# Patient Record
Sex: FEMALE | Race: BLACK OR AFRICAN AMERICAN | URBAN - METROPOLITAN AREA
[De-identification: names, ages, dates, MRNs, and addresses within clinical notes are randomized per-mention and may not be internally consistent; named-entity substitution may affect disease eponyms.]

---

## 2018-10-24 PROBLEM — L70.0 ACNE VULGARIS: Status: ACTIVE | Noted: 2018-10-24

## 2018-12-14 PROBLEM — Z30.46 NEXPLANON REMOVAL: Status: ACTIVE | Noted: 2018-12-14

## 2019-06-10 PROBLEM — R87.618 PAP SMEAR ABNORMALITY OF CERVIX/HUMAN PAPILLOMAVIRUS (HPV) POSITIVE: Status: ACTIVE | Noted: 2018-08-06

## 2019-06-10 PROBLEM — O13.9 GESTATIONAL HYPERTENSION: Status: ACTIVE | Noted: 2017-06-27

## 2019-06-10 PROBLEM — Z34.93 PRENATAL CARE IN THIRD TRIMESTER: Status: ACTIVE | Noted: 2017-06-14

## 2019-12-12 PROBLEM — Z34.93 PRENATAL CARE IN THIRD TRIMESTER: Status: RESOLVED | Noted: 2017-06-14 | Resolved: 2019-12-12

## 2019-12-12 PROBLEM — O09.292 HX OF PREECLAMPSIA, PRIOR PREGNANCY, CURRENTLY PREGNANT, SECOND TRIMESTER: Status: ACTIVE | Noted: 2019-12-12

## 2019-12-12 PROBLEM — O09.93 SUPERVISION OF HIGH RISK PREGNANCY IN THIRD TRIMESTER: Status: ACTIVE | Noted: 2019-12-12

## 2019-12-12 PROBLEM — Z30.46 NEXPLANON REMOVAL: Status: RESOLVED | Noted: 2018-12-14 | Resolved: 2019-12-12

## 2020-01-17 ENCOUNTER — HOSPITAL ENCOUNTER (OUTPATIENT)
Dept: LAB | Age: 25
Discharge: HOME | End: 2020-01-17
Payer: COMMERCIAL

## 2020-01-17 DIAGNOSIS — O09.292 HX OF PREECLAMPSIA, PRIOR PREGNANCY, CURRENTLY PREGNANT, SECOND TRIMESTER: ICD-10-CM

## 2020-01-17 LAB
ALBUMIN SERPL-MCNC: 2.9 G/DL (ref 3.5–5)
ALBUMIN/GLOB SERPL: 0.7 {RATIO} (ref 1.2–3.5)
ALP SERPL-CCNC: 48 U/L (ref 50–136)
ALT SERPL-CCNC: 12 U/L (ref 12–65)
ANION GAP SERPL CALC-SCNC: 7 MMOL/L (ref 7–16)
AST SERPL-CCNC: 16 U/L (ref 15–37)
BASOPHILS # BLD: 0 K/UL (ref 0–0.2)
BASOPHILS NFR BLD: 0 % (ref 0–2)
BILIRUB SERPL-MCNC: 0.5 MG/DL (ref 0.2–1.1)
BUN SERPL-MCNC: 7 MG/DL (ref 6–23)
CALCIUM SERPL-MCNC: 8.6 MG/DL (ref 8.3–10.4)
CHLORIDE SERPL-SCNC: 106 MMOL/L (ref 98–107)
CO2 SERPL-SCNC: 23 MMOL/L (ref 21–32)
COLLECT DURATION TIME UR: 24 HR
CREAT 24H UR-MRATE: 4890 MG/24 HR (ref 600–1800)
CREAT SERPL-MCNC: 0.46 MG/DL (ref 0.6–1)
CREAT UR-MCNC: 163 MG/DL
DIFFERENTIAL METHOD BLD: ABNORMAL
EOSINOPHIL # BLD: 0.1 K/UL (ref 0–0.8)
EOSINOPHIL NFR BLD: 1 % (ref 0.5–7.8)
ERYTHROCYTE [DISTWIDTH] IN BLOOD BY AUTOMATED COUNT: 12.1 % (ref 11.9–14.6)
GLOBULIN SER CALC-MCNC: 3.9 G/DL (ref 2.3–3.5)
GLUCOSE SERPL-MCNC: 82 MG/DL (ref 65–100)
HCT VFR BLD AUTO: 29 % (ref 35.8–46.3)
HGB BLD-MCNC: 9.5 G/DL (ref 11.7–15.4)
IMM GRANULOCYTES # BLD AUTO: 0 K/UL (ref 0–0.5)
IMM GRANULOCYTES NFR BLD AUTO: 0 % (ref 0–5)
LDH SERPL L TO P-CCNC: 127 U/L (ref 100–190)
LYMPHOCYTES # BLD: 1.2 K/UL (ref 0.5–4.6)
LYMPHOCYTES NFR BLD: 18 % (ref 13–44)
MCH RBC QN AUTO: 28.4 PG (ref 26.1–32.9)
MCHC RBC AUTO-ENTMCNC: 32.8 G/DL (ref 31.4–35)
MCV RBC AUTO: 86.8 FL (ref 79.6–97.8)
MONOCYTES # BLD: 0.6 K/UL (ref 0.1–1.3)
MONOCYTES NFR BLD: 9 % (ref 4–12)
NEUTS SEG # BLD: 4.9 K/UL (ref 1.7–8.2)
NEUTS SEG NFR BLD: 72 % (ref 43–78)
NRBC # BLD: 0 K/UL (ref 0–0.2)
PLATELET # BLD AUTO: 246 K/UL (ref 150–450)
PMV BLD AUTO: 9.9 FL (ref 9.4–12.3)
POTASSIUM SERPL-SCNC: 3.5 MMOL/L (ref 3.5–5.1)
PROT SERPL-MCNC: 6.8 G/DL (ref 6.3–8.2)
RBC # BLD AUTO: 3.34 M/UL (ref 4.05–5.2)
SODIUM SERPL-SCNC: 136 MMOL/L (ref 136–145)
SPECIMEN VOL ?TM UR: 3000 ML
URATE SERPL-MCNC: 3.9 MG/DL (ref 2.6–6)
WBC # BLD AUTO: 6.8 K/UL (ref 4.3–11.1)

## 2020-01-17 PROCEDURE — 84550 ASSAY OF BLOOD/URIC ACID: CPT

## 2020-01-17 PROCEDURE — 36415 COLL VENOUS BLD VENIPUNCTURE: CPT

## 2020-01-17 PROCEDURE — 85025 COMPLETE CBC W/AUTO DIFF WBC: CPT

## 2020-01-17 PROCEDURE — 82570 ASSAY OF URINE CREATININE: CPT

## 2020-01-17 PROCEDURE — 80053 COMPREHEN METABOLIC PANEL: CPT

## 2020-01-17 PROCEDURE — 83615 LACTATE (LD) (LDH) ENZYME: CPT

## 2020-01-17 PROCEDURE — 86335 IMMUNFIX E-PHORSIS/URINE/CSF: CPT

## 2020-01-17 PROCEDURE — 84156 ASSAY OF PROTEIN URINE: CPT

## 2020-01-20 LAB
ALBUMIN UR ELPH-MCNC: 3.7 MG/DL
ALBUMIN/GLOB SERPL: 0.3 {RATIO}
ALPHA1 GLOB 24H UR ELPH-MCNC: 1.5 MG/DL
ALPHA2 GLOB SERPL ELPH-MCNC: 4 MG/DL
B-GLOBULIN UR QL ELPH: 5.1 MG/DL
COLLECT DURATION TIME UR: 24 HR
GAMMA GLOB MFR UR ELPH: 3.7 MG/DL
M PROTEIN UR-MCNC: ABNORMAL MG/DL
PROT 24H UR-MRATE: 540 MG/24HR (ref 40–150)
PROT PATTERN SPEC IFE-IMP: ABNORMAL
PROT PATTERN UR ELPH-IMP: ABNORMAL
PROT UR-MCNC: 18 MG/DL
SPECIMEN VOL ?TM UR: 3000 ML

## 2020-03-25 ENCOUNTER — HOSPITAL ENCOUNTER (OUTPATIENT)
Dept: LAB | Age: 25
Discharge: HOME | End: 2020-03-25
Payer: COMMERCIAL

## 2020-03-25 LAB
COLLECT DURATION TIME UR: 24 HR
CREAT 24H UR-MRATE: 1627.5 MG/24 HR (ref 600–1800)
CREAT UR-MCNC: 155 MG/DL
SPECIMEN VOL ?TM UR: 1050 ML

## 2020-03-25 PROCEDURE — 81050 URINALYSIS VOLUME MEASURE: CPT

## 2020-03-25 PROCEDURE — 84156 ASSAY OF PROTEIN URINE: CPT

## 2020-03-27 LAB
ALBUMIN 24H MFR UR ELPH: 15.1 %
ALPHA1 GLOB 24H MFR UR ELPH: 3.1 %
ALPHA2 GLOB 24H MFR UR ELPH: 11.1 %
B-GLOBULIN MFR UR ELPH: 44.1 %
COLLECT DURATION TIME UR: 24 HR
GAMMA GLOB 24H MFR UR ELPH: 26.6 %
INTERPRETATION UR IFE-IMP: ABNORMAL
M PROTEIN 24H MFR UR ELPH: ABNORMAL %
NOTE, 149533: ABNORMAL
PROT 24H UR-MRATE: 176 MG/24 HR (ref 30–150)
PROT UR-MCNC: 16 MG/DL
SPECIMEN VOL ?TM UR: 1100 ML

## 2020-03-30 NOTE — PROGRESS NOTES
Per Dr Steven Donahue instructions left message of normal results.  Also explained that if she saw results on Mychart,  Some labs are showing as abnormal explained that you have to look at the all the labs as a whole

## 2020-05-03 ENCOUNTER — HOSPITAL ENCOUNTER (OUTPATIENT)
Age: 25
Discharge: HOME | End: 2020-05-03
Attending: OBSTETRICS & GYNECOLOGY | Admitting: OBSTETRICS & GYNECOLOGY
Payer: COMMERCIAL

## 2020-05-03 VITALS
HEART RATE: 90 BPM | TEMPERATURE: 99.4 F | SYSTOLIC BLOOD PRESSURE: 117 MMHG | RESPIRATION RATE: 18 BRPM | DIASTOLIC BLOOD PRESSURE: 71 MMHG

## 2020-05-03 PROBLEM — R10.9 ABDOMINAL PAIN DURING PREGNANCY IN THIRD TRIMESTER: Status: ACTIVE | Noted: 2020-05-03

## 2020-05-03 PROBLEM — O26.893 ABDOMINAL PAIN DURING PREGNANCY IN THIRD TRIMESTER: Status: ACTIVE | Noted: 2020-05-03

## 2020-05-03 PROCEDURE — 99283 EMERGENCY DEPT VISIT LOW MDM: CPT

## 2020-05-03 PROCEDURE — 59025 FETAL NON-STRESS TEST: CPT

## 2020-05-03 RX ORDER — GUAIFENESIN 100 MG/5ML
81 LIQUID (ML) ORAL DAILY
COMMUNITY
End: 2020-05-22

## 2020-05-03 NOTE — PROGRESS NOTES
1233-Pt to 437 for PHILIP with c/o contractions this morning but since arriving to hospital have stopped. Galo Luarianne Upon entrance to hospital pt with temp of 100. Check 3 times at door. Mask placed and placed in 437. Pt denies any COVID-19 exposure or h/o travel. Denies symptoms of COVID-19. Tempeture recheck in room was 98.9 and 99.4 oral.     1240-Dr. Salas to room. SVE 0/0/high. May DC home with labor precautions. 1250-Patient discharged home per MD order. Discharge instructions completed and patient verbalized understanding. Questions encouraged. Stable at discharge.

## 2020-05-03 NOTE — DISCHARGE INSTRUCTIONS
Patient Education        Pregnancy Precautions: Care Instructions  Your Care Instructions    There is no sure way to prevent labor before your due date ( labor) or to prevent most other pregnancy problems. But there are things you can do to increase your chances of a healthy pregnancy. Go to your appointments, follow your doctor's advice, and take good care of yourself. Eat well, and exercise (if your doctor agrees). And make sure to drink plenty of water. Follow-up care is a key part of your treatment and safety. Be sure to make and go to all appointments, and call your doctor if you are having problems. It's also a good idea to know your test results and keep a list of the medicines you take. How can you care for yourself at home? · Make sure you go to your prenatal appointments. At each visit, your doctor will check your blood pressure. Your doctor will also check to see if you have protein in your urine. High blood pressure and protein in urine are signs of preeclampsia. This condition can be dangerous for you and your baby. · Drink plenty of fluids, enough so that your urine is light yellow or clear like water. Dehydration can cause contractions. If you have kidney, heart, or liver disease and have to limit fluids, talk with your doctor before you increase the amount of fluids you drink. · Tell your doctor right away if you notice any symptoms of an infection, such as:  ? Burning when you urinate. ? A foul-smelling discharge from your vagina. ? Vaginal itching. ? Unexplained fever. ? Unusual pain or soreness in your uterus or lower belly. · Eat a balanced diet. Include plenty of foods that are high in calcium and iron. ? Foods high in calcium include milk, cheese, yogurt, almonds, and broccoli. ? Foods high in iron include red meat, shellfish, poultry, eggs, beans, raisins, whole-grain bread, and leafy green vegetables. · Do not smoke.  If you need help quitting, talk to your doctor about stop-smoking programs and medicines. These can increase your chances of quitting for good. · Do not drink alcohol or use illegal drugs. · Follow your doctor's directions about activity. Your doctor will let you know how much, if any, exercise you can do. · Ask your doctor if you can have sex. If you are at risk for early labor, your doctor may ask you to not have sex. · Take care to prevent falls. During pregnancy, your joints are loose, and your balance is off. Sports such as bicycling, skiing, or in-line skating can increase your risk of falling. And don't ride horses or motorcycles, dive, water ski, scuba dive, or parachute jump while you are pregnant. · Avoid getting very hot. Do not use saunas or hot tubs. Avoid staying out in the sun in hot weather for long periods. Take acetaminophen (Tylenol) to lower a high fever. · Do not take any over-the-counter or herbal medicines or supplements without talking to your doctor or pharmacist first.  When should you call for help? Call 911 anytime you think you may need emergency care. For example, call if:    · You passed out (lost consciousness).     · You have a seizure.     · You have severe vaginal bleeding.     · You have severe pain in your belly or pelvis.     · You have had fluid gushing or leaking from your vagina and you know or think the umbilical cord is bulging into your vagina. If this happens, immediately get down on your knees so your rear end (buttocks) is higher than your head. This will decrease the pressure on the cord until help arrives.   [facility] your doctor now or seek immediate medical care if:    · You have signs of preeclampsia, such as:  ? Sudden swelling of your face, hands, or feet. ? New vision problems (such as dimness, blurring, or seeing spots).   ? A severe headache.     · You have any vaginal bleeding.     · You have belly pain or cramping.     · You have a fever.     · You have had regular contractions (with or without pain) for an hour. This means that you have 8 or more within 1 hour or 4 or more in 20 minutes after you change your position and drink fluids.     · You have a sudden release of fluid from your vagina.     · You have low back pain or pelvic pressure that does not go away.     · You notice that your baby has stopped moving or is moving much less than normal.    Watch closely for changes in your health, and be sure to contact your doctor if you have any problems. Where can you learn more? Go to http://karli-ervin.info/  Enter Y951 in the search box to learn more about \"Pregnancy Precautions: Care Instructions. \"  Current as of: May 29, 2019Content Version: 12.4  © 8011-2976 [employer]. Care instructions adapted under license by [employer] (which disclaims liability or warranty for this information). If you have questions about a medical condition or this instruction, always ask your healthcare professional. [address] any warranty or liability for your use of this information.

## 2020-05-03 NOTE — H&P
Chief Complaint: ctx      21 y.o. female  at 37w1d  weeks gestation who is seen for several hours of irregular ctx that have now resolved. Pt notes good FM. She denies VB, LOF, UTI or PEC symptoms. Pt denies nausea, vomiting, fevers, chills, CP, SOB, cough, mailaise, muscle aches, recent travel, or any known exposure to the COVID-19 virus. HISTORY:    Social History     Substance and Sexual Activity   Sexual Activity Yes    Partners: Male    Birth control/protection: Implant     Patient's last menstrual period was 2019 (exact date).     Social History     Socioeconomic History    Marital status: SINGLE     Spouse name: Not on file    Number of children: Not on file    Years of education: Not on file    Highest education level: Not on file   Occupational History    Not on file   Social Needs    Financial resource strain: Not on file    Food insecurity     Worry: Not on file     Inability: Not on file    Transportation needs     Medical: Not on file     Non-medical: Not on file   Tobacco Use    Smoking status: Never Smoker    Smokeless tobacco: Never Used   Substance and Sexual Activity    Alcohol use: No    Drug use: No    Sexual activity: Yes     Partners: Male     Birth control/protection: Implant   Lifestyle    Physical activity     Days per week: Not on file     Minutes per session: Not on file    Stress: Not on file   Relationships    Social connections     Talks on phone: Not on file     Gets together: Not on file     Attends Rastafarian service: Not on file     Active member of club or organization: Not on file     Attends meetings of clubs or organizations: Not on file     Relationship status: Not on file    Intimate partner violence     Fear of current or ex partner: Not on file     Emotionally abused: Not on file     Physically abused: Not on file     Forced sexual activity: Not on file   Other Topics Concern   [address] Service Not Asked    Blood Transfusions Not Asked  Caffeine Concern Yes    Occupational Exposure Not Asked    Hobby Hazards Not Asked    Sleep Concern Not Asked    Stress Concern Not Asked    Weight Concern Not Asked    Special Diet Not Asked    Back Care Not Asked    Exercise No    Bike Helmet Not Asked    Seat Belt Yes    Self-Exams Yes   Social History Narrative    Abuse: Feels safe at home, no history of physical abuse, no history of sexual abuse       Past Surgical History:   Procedure Laterality Date    GASTRIC BYPASS,OBESE<150CM NAPOLEON-EN-Y         Past Medical History:   Diagnosis Date    Gestational hypertension          ROS:  An 8 point review of symptoms negative except for chief complaint as described above. PHYSICAL EXAM:  Blood pressure 117/71, pulse 90, temperature 98.9 °F (37.2 °C), resp. rate 18, last menstrual period 09/23/2019. Constitutional: The patient appears well, alert, oriented x 3. Cardiovascular: Heart RRR, no murmurs. Respiratory: Lungs clear, no respiratory distress  GI: Abdomen soft, nontender, no guarding  No fundal tenderness  Musculoskeletal: no cva tenderness  Upper ext: no edema, reflexes +2  Lower ext: no edema, neg donell's, reflexes +2  Skin: no rashes or lesions  Psychiatric:Mood/ Affect: appropriate  Genitourinary: SVE: long and closed  FHT: Category 1 with mod variability and + accels; reactive  TOCO: rare ctx    I personally reviewed pt's medical record including relevant labs and ultrasounds  I reviewed the NST at today's encounter    Assessment/Plan:  Pt presents with false labor. Upon entering the hospital pt's temp was taken and she was noted to have a slight elevation in the temp. Temps taken on L&D are all normal; pt's H&P is unremarkable; pt has no h/o travel or known exposure to the COVID-19 virus. In light of all this, I believe the temp taken at the hospital entrance was in error. Pt discharged to home with labor precautions. Pt to f/u with her PObP in the next 1-2 days.

## 2020-05-18 NOTE — PROGRESS NOTES
Verbal orders received from Dr Gely Chambers for AM IOL on 5/20/2020. Patient notified by Dr Gely Chambers to arrive @ [id number].

## 2020-05-20 ENCOUNTER — HOSPITAL ENCOUNTER (INPATIENT)
Age: 25
LOS: 2 days | Discharge: HOME | End: 2020-05-22
Attending: OBSTETRICS & GYNECOLOGY | Admitting: OBSTETRICS & GYNECOLOGY
Payer: COMMERCIAL

## 2020-05-20 ENCOUNTER — ANESTHESIA EVENT (OUTPATIENT)
Dept: LABOR AND DELIVERY | Age: 25
End: 2020-05-20
Payer: COMMERCIAL

## 2020-05-20 ENCOUNTER — ANESTHESIA (OUTPATIENT)
Dept: LABOR AND DELIVERY | Age: 25
End: 2020-05-20
Payer: COMMERCIAL

## 2020-05-20 PROBLEM — O13.3 GESTATIONAL HTN, THIRD TRIMESTER: Status: ACTIVE | Noted: 2020-05-20

## 2020-05-20 PROBLEM — Z3A.39 39 WEEKS GESTATION OF PREGNANCY: Status: ACTIVE | Noted: 2020-05-20

## 2020-05-20 PROBLEM — Z34.90 ENCOUNTER FOR PLANNED INDUCTION OF LABOR: Status: ACTIVE | Noted: 2020-05-20

## 2020-05-20 LAB
ABO + RH BLD: NORMAL
ALBUMIN SERPL-MCNC: 2.6 G/DL (ref 3.5–5)
ALBUMIN/GLOB SERPL: 0.7 {RATIO} (ref 1.2–3.5)
ALP SERPL-CCNC: 131 U/L (ref 50–130)
ALT SERPL-CCNC: 17 U/L (ref 12–65)
ANION GAP SERPL CALC-SCNC: 10 MMOL/L (ref 7–16)
ARTERIAL PATENCY WRIST A: ABNORMAL
ARTERIAL PATENCY WRIST A: ABNORMAL
AST SERPL-CCNC: 25 U/L (ref 15–37)
BASE DEFICIT BLD-SCNC: 5 MMOL/L
BASE DEFICIT BLD-SCNC: 7 MMOL/L
BASOPHILS # BLD: 0 K/UL (ref 0–0.2)
BASOPHILS NFR BLD: 0 % (ref 0–2)
BDY SITE: ABNORMAL
BDY SITE: ABNORMAL
BILIRUB SERPL-MCNC: 0.3 MG/DL (ref 0.2–1.1)
BLOOD GROUP ANTIBODIES SERPL: NORMAL
BUN SERPL-MCNC: 11 MG/DL (ref 6–23)
CA-I BLD-MCNC: 1.57 MMOL/L (ref 1.12–1.32)
CA-I BLD-MCNC: 1.7 MMOL/L (ref 1.12–1.32)
CALCIUM SERPL-MCNC: 8.8 MG/DL (ref 8.3–10.4)
CHLORIDE SERPL-SCNC: 107 MMOL/L (ref 98–107)
CO2 SERPL-SCNC: 20 MMOL/L (ref 21–32)
COLLECT TIME,HTIME: 1948
COLLECT TIME,HTIME: 1948
CREAT SERPL-MCNC: 0.68 MG/DL (ref 0.6–1)
DIFFERENTIAL METHOD BLD: ABNORMAL
EOSINOPHIL # BLD: 0.1 K/UL (ref 0–0.8)
EOSINOPHIL NFR BLD: 1 % (ref 0.5–7.8)
ERYTHROCYTE [DISTWIDTH] IN BLOOD BY AUTOMATED COUNT: 13.4 % (ref 11.9–14.6)
GAS FLOW.O2 O2 DELIVERY SYS: ABNORMAL L/MIN
GAS FLOW.O2 O2 DELIVERY SYS: ABNORMAL L/MIN
GLOBULIN SER CALC-MCNC: 4 G/DL (ref 2.3–3.5)
GLUCOSE BLD STRIP.AUTO-MCNC: 85 MG/DL (ref 65–100)
GLUCOSE BLD STRIP.AUTO-MCNC: 86 MG/DL (ref 65–100)
GLUCOSE SERPL-MCNC: 85 MG/DL (ref 65–100)
HCO3 BLD-SCNC: 21.3 MMOL/L (ref 22–26)
HCO3 BLD-SCNC: 22 MMOL/L (ref 22–26)
HCT VFR BLD AUTO: 31.7 % (ref 35.8–46.3)
HGB BLD-MCNC: 10.3 G/DL (ref 11.7–15.4)
IMM GRANULOCYTES # BLD AUTO: 0.1 K/UL (ref 0–0.5)
IMM GRANULOCYTES NFR BLD AUTO: 1 % (ref 0–5)
LYMPHOCYTES # BLD: 1.7 K/UL (ref 0.5–4.6)
LYMPHOCYTES NFR BLD: 21 % (ref 13–44)
MCH RBC QN AUTO: 27.8 PG (ref 26.1–32.9)
MCHC RBC AUTO-ENTMCNC: 32.5 G/DL (ref 31.4–35)
MCV RBC AUTO: 85.7 FL (ref 79.6–97.8)
MONOCYTES # BLD: 0.8 K/UL (ref 0.1–1.3)
MONOCYTES NFR BLD: 10 % (ref 4–12)
NEUTS SEG # BLD: 5.5 K/UL (ref 1.7–8.2)
NEUTS SEG NFR BLD: 67 % (ref 43–78)
NRBC # BLD: 0 K/UL (ref 0–0.2)
O2/TOTAL GAS SETTING VFR VENT: 21 %
O2/TOTAL GAS SETTING VFR VENT: 21 %
PCO2 BLD: 49 MMHG (ref 35–45)
PCO2 BLD: 50.1 MMHG (ref 35–45)
PH BLD: 7.24 [PH] (ref 7.35–7.45)
PH BLD: 7.26 [PH] (ref 7.35–7.45)
PLATELET # BLD AUTO: 205 K/UL (ref 150–450)
PMV BLD AUTO: 11.8 FL (ref 9.4–12.3)
PO2 BLD: 17 MMHG (ref 75–100)
PO2 BLD: 19 MMHG (ref 75–100)
POTASSIUM BLD-SCNC: 5 MMOL/L (ref 3.5–5.1)
POTASSIUM BLD-SCNC: 5.1 MMOL/L (ref 3.5–5.1)
POTASSIUM SERPL-SCNC: 3.5 MMOL/L (ref 3.5–5.1)
PROT SERPL-MCNC: 6.6 G/DL (ref 6.3–8.2)
RBC # BLD AUTO: 3.7 M/UL (ref 4.05–5.2)
SAO2 % BLD: 19 % (ref 95–98)
SAO2 % BLD: 22 % (ref 95–98)
SERVICE CMNT-IMP: ABNORMAL
SERVICE CMNT-IMP: ABNORMAL
SODIUM BLD-SCNC: 136 MMOL/L (ref 136–145)
SODIUM BLD-SCNC: 138 MMOL/L (ref 136–145)
SODIUM SERPL-SCNC: 137 MMOL/L (ref 136–145)
SPECIMEN EXP DATE BLD: NORMAL
SPECIMEN TYPE: ABNORMAL
SPECIMEN TYPE: ABNORMAL
WBC # BLD AUTO: 8.2 K/UL (ref 4.3–11.1)

## 2020-05-20 PROCEDURE — 3E033VJ INTRODUCTION OF OTHER HORMONE INTO PERIPHERAL VEIN, PERCUTANEOUS APPROACH: ICD-10-PCS | Performed by: OBSTETRICS & GYNECOLOGY

## 2020-05-20 PROCEDURE — 00HU33Z INSERTION OF INFUSION DEVICE INTO SPINAL CANAL, PERCUTANEOUS APPROACH: ICD-10-PCS | Performed by: ANESTHESIOLOGY

## 2020-05-20 PROCEDURE — 74011000258 HC RX REV CODE- 258: Performed by: OBSTETRICS & GYNECOLOGY

## 2020-05-20 PROCEDURE — 77030011945 HC CATH URIN INT ST MENT -A

## 2020-05-20 PROCEDURE — 74011250637 HC RX REV CODE- 250/637: Performed by: OBSTETRICS & GYNECOLOGY

## 2020-05-20 PROCEDURE — 85025 COMPLETE CBC W/AUTO DIFF WBC: CPT

## 2020-05-20 PROCEDURE — 65270000029 HC RM PRIVATE

## 2020-05-20 PROCEDURE — 82947 ASSAY GLUCOSE BLOOD QUANT: CPT

## 2020-05-20 PROCEDURE — 74011250636 HC RX REV CODE- 250/636: Performed by: OBSTETRICS & GYNECOLOGY

## 2020-05-20 PROCEDURE — 82803 BLOOD GASES ANY COMBINATION: CPT

## 2020-05-20 PROCEDURE — 74011250636 HC RX REV CODE- 250/636: Performed by: ANESTHESIOLOGY

## 2020-05-20 PROCEDURE — 80053 COMPREHEN METABOLIC PANEL: CPT

## 2020-05-20 PROCEDURE — 77030018846 HC SOL IRR STRL H20 ICUM -A

## 2020-05-20 PROCEDURE — A4300 CATH IMPL VASC ACCESS PORTAL: HCPCS | Performed by: ANESTHESIOLOGY

## 2020-05-20 PROCEDURE — 77030014125 HC TY EPDRL BBMI -B: Performed by: ANESTHESIOLOGY

## 2020-05-20 PROCEDURE — 86900 BLOOD TYPING SEROLOGIC ABO: CPT

## 2020-05-20 PROCEDURE — 36415 COLL VENOUS BLD VENIPUNCTURE: CPT

## 2020-05-20 RX ORDER — SODIUM CHLORIDE 0.9 % (FLUSH) 0.9 %
5-40 SYRINGE (ML) INJECTION AS NEEDED
Status: DISCONTINUED | OUTPATIENT
Start: 2020-05-20 | End: 2020-05-21

## 2020-05-20 RX ORDER — LIDOCAINE HYDROCHLORIDE 20 MG/ML
JELLY TOPICAL
Status: DISCONTINUED | OUTPATIENT
Start: 2020-05-20 | End: 2020-05-20 | Stop reason: HOSPADM

## 2020-05-20 RX ORDER — PRENATAL VIT 96/IRON FUM/FOLIC 27MG-0.8MG
1 TABLET ORAL DAILY
Status: DISCONTINUED | OUTPATIENT
Start: 2020-05-21 | End: 2020-05-22 | Stop reason: HOSPADM

## 2020-05-20 RX ORDER — DIPHENHYDRAMINE HCL 25 MG
25 CAPSULE ORAL
Status: DISCONTINUED | OUTPATIENT
Start: 2020-05-20 | End: 2020-05-22 | Stop reason: HOSPADM

## 2020-05-20 RX ORDER — OXYTOCIN/RINGER'S LACTATE 30/500 ML
0-25 PLASTIC BAG, INJECTION (ML) INTRAVENOUS
Status: DISCONTINUED | OUTPATIENT
Start: 2020-05-20 | End: 2020-05-20 | Stop reason: HOSPADM

## 2020-05-20 RX ORDER — IBUPROFEN 800 MG/1
800 TABLET ORAL 3 TIMES DAILY
Status: DISCONTINUED | OUTPATIENT
Start: 2020-05-20 | End: 2020-05-22 | Stop reason: HOSPADM

## 2020-05-20 RX ORDER — SIMETHICONE 80 MG
80 TABLET,CHEWABLE ORAL
Status: DISCONTINUED | OUTPATIENT
Start: 2020-05-20 | End: 2020-05-22 | Stop reason: HOSPADM

## 2020-05-20 RX ORDER — LIDOCAINE HYDROCHLORIDE 10 MG/ML
1 INJECTION INFILTRATION; PERINEURAL
Status: DISCONTINUED | OUTPATIENT
Start: 2020-05-20 | End: 2020-05-20 | Stop reason: HOSPADM

## 2020-05-20 RX ORDER — ROPIVACAINE HYDROCHLORIDE 5 MG/ML
INJECTION, SOLUTION EPIDURAL; INFILTRATION; PERINEURAL AS NEEDED
Status: DISCONTINUED | OUTPATIENT
Start: 2020-05-20 | End: 2020-05-20 | Stop reason: HOSPADM

## 2020-05-20 RX ORDER — ADHESIVE BANDAGE
30 BANDAGE TOPICAL DAILY PRN
Status: DISCONTINUED | OUTPATIENT
Start: 2020-05-20 | End: 2020-05-22 | Stop reason: HOSPADM

## 2020-05-20 RX ORDER — DEXTROSE, SODIUM CHLORIDE, SODIUM LACTATE, POTASSIUM CHLORIDE, AND CALCIUM CHLORIDE 5; .6; .31; .03; .02 G/100ML; G/100ML; G/100ML; G/100ML; G/100ML
125 INJECTION, SOLUTION INTRAVENOUS CONTINUOUS
Status: DISCONTINUED | OUTPATIENT
Start: 2020-05-20 | End: 2020-05-22 | Stop reason: HOSPADM

## 2020-05-20 RX ORDER — ZOLPIDEM TARTRATE 5 MG/1
5 TABLET ORAL
Status: DISCONTINUED | OUTPATIENT
Start: 2020-05-20 | End: 2020-05-22 | Stop reason: HOSPADM

## 2020-05-20 RX ORDER — OXYTOCIN/RINGER'S LACTATE 30/500 ML
250 PLASTIC BAG, INJECTION (ML) INTRAVENOUS ONCE
Status: DISPENSED | OUTPATIENT
Start: 2020-05-20 | End: 2020-05-20

## 2020-05-20 RX ORDER — ROPIVACAINE HYDROCHLORIDE 2 MG/ML
INJECTION, SOLUTION EPIDURAL; INFILTRATION; PERINEURAL AS NEEDED
Status: DISCONTINUED | OUTPATIENT
Start: 2020-05-20 | End: 2020-05-20 | Stop reason: HOSPADM

## 2020-05-20 RX ORDER — MINERAL OIL
120 OIL (ML) ORAL
Status: COMPLETED | OUTPATIENT
Start: 2020-05-20 | End: 2020-05-20

## 2020-05-20 RX ORDER — BUTORPHANOL TARTRATE 2 MG/ML
1 INJECTION INTRAMUSCULAR; INTRAVENOUS
Status: DISCONTINUED | OUTPATIENT
Start: 2020-05-20 | End: 2020-05-20 | Stop reason: HOSPADM

## 2020-05-20 RX ORDER — ONDANSETRON 4 MG/1
4 TABLET, ORALLY DISINTEGRATING ORAL
Status: ACTIVE | OUTPATIENT
Start: 2020-05-20 | End: 2020-05-21

## 2020-05-20 RX ORDER — HYDROCODONE BITARTRATE AND ACETAMINOPHEN 5; 325 MG/1; MG/1
1 TABLET ORAL
Status: DISCONTINUED | OUTPATIENT
Start: 2020-05-20 | End: 2020-05-22 | Stop reason: HOSPADM

## 2020-05-20 RX ORDER — SODIUM CHLORIDE 0.9 % (FLUSH) 0.9 %
5-40 SYRINGE (ML) INJECTION EVERY 8 HOURS
Status: DISCONTINUED | OUTPATIENT
Start: 2020-05-20 | End: 2020-05-21

## 2020-05-20 RX ORDER — NALOXONE HYDROCHLORIDE 0.4 MG/ML
0.4 INJECTION, SOLUTION INTRAMUSCULAR; INTRAVENOUS; SUBCUTANEOUS AS NEEDED
Status: DISCONTINUED | OUTPATIENT
Start: 2020-05-20 | End: 2020-05-22 | Stop reason: HOSPADM

## 2020-05-20 RX ADMIN — IBUPROFEN 800 MG: 800 TABLET, FILM COATED ORAL at 23:46

## 2020-05-20 RX ADMIN — SODIUM CHLORIDE 2.5 MILLION UNITS: 9 INJECTION, SOLUTION INTRAVENOUS at 16:55

## 2020-05-20 RX ADMIN — Medication 8 ML: at 14:00

## 2020-05-20 RX ADMIN — SODIUM CHLORIDE 2.5 MILLION UNITS: 9 INJECTION, SOLUTION INTRAVENOUS at 13:34

## 2020-05-20 RX ADMIN — MINERAL OIL 120 ML: 471.95 OIL ORAL at 19:45

## 2020-05-20 RX ADMIN — SODIUM CHLORIDE, SODIUM LACTATE, POTASSIUM CHLORIDE, CALCIUM CHLORIDE, AND DEXTROSE MONOHYDRATE 125 ML/HR: 600; 310; 30; 20; 5 INJECTION, SOLUTION INTRAVENOUS at 08:26

## 2020-05-20 RX ADMIN — SODIUM CHLORIDE 5 MILLION UNITS: 900 INJECTION, SOLUTION INTRAVENOUS at 08:25

## 2020-05-20 RX ADMIN — ROPIVACAINE HYDROCHLORIDE 13 ML: 150 INJECTION, SOLUTION EPIDURAL; INFILTRATION; PERINEURAL at 13:59

## 2020-05-20 RX ADMIN — Medication 2 MILLI-UNITS/MIN: at 08:46

## 2020-05-20 NOTE — PROGRESS NOTES
05/20/20 1219   Membranes   Membrane Status AROM   Sac Identifier Sac 1   Rupture Date 05/20/20   Rupture Time 1219   Amniotic Fluid Description Clear   Amniotic Fluid Volume  Scant   Cervical Exam   Dilation (cm) 4   Eff 50 %   Station -2   Vaginal exam done by?   Dr Andre Slade

## 2020-05-20 NOTE — ANESTHESIA PROCEDURE NOTES
Epidural Block    Start time: 5/20/2020 1:49 PM  End time: 5/20/2020 1:59 PM  Performed by: Taylor Crowley MD  Authorized by: Taylor Crowley MD     Pre-Procedure  Indication: at surgeon's request and labor epidural    Preanesthetic Checklist: patient identified, risks and benefits discussed, anesthesia consent, site marked, patient being monitored, timeout performed and anesthesia consent    Timeout Time: 13:57        Epidural:   Patient position:  Seated  Prep region:  Lumbar  Prep: Chlorhexidine    Location:  L3-4    Needle and Epidural Catheter:   Needle Type:  Tuohy  Needle Gauge:  17 G  Injection Technique:  Loss of resistance using saline  Attempts:  1  Catheter Size:  19 G  Events: no blood with aspiration, no cerebrospinal fluid with aspiration, no paresthesia and negative aspiration test    Test Dose:  Negative    Assessment:   Catheter Secured:  Tegaderm and tape  Insertion:  Uncomplicated  Patient tolerance:  Patient tolerated the procedure well with no immediate complications

## 2020-05-20 NOTE — PROGRESS NOTES
05/20/20 1551   Cervical Exam   Dilation (cm) 8   Eff 90 %   Station -1   SC for 150 cc clear yellow urine. Repositioned to throne.

## 2020-05-20 NOTE — PROGRESS NOTES
05/20/20 1435   Cervical Exam   Dilation (cm) 6  (6-7)   Eff 70 %   Station -2   Pt assisted to left lateral on p nut birthing ball.

## 2020-05-20 NOTE — PROGRESS NOTES
Admitted for induction of labor. Admission assessment as documented. Oriented to room, call system and white board. Plan of care reviewed and questions answered. Consents signed, identification band verified and placed. IV placed, blood work drawn per order and sent to lab.

## 2020-05-20 NOTE — ANESTHESIA PREPROCEDURE EVALUATION
Relevant Problems   No relevant active problems       Anesthetic History   No history of anesthetic complications            Review of Systems / Medical History  Patient summary reviewed and pertinent labs reviewed    Pulmonary  Within defined limits                 Neuro/Psych   Within defined limits           Cardiovascular  Within defined limits  Hypertension (gest): well controlled              Exercise tolerance: >4 METS     GI/Hepatic/Renal  Within defined limits              Endo/Other  Within defined limits           Other Findings   Comments: primip  PROM           Physical Exam    Airway  Mallampati: I  TM Distance: 4 - 6 cm  Neck ROM: normal range of motion   Mouth opening: Normal     Cardiovascular  Regular rate and rhythm,  S1 and S2 normal,  no murmur, click, rub, or gallop  Rhythm: regular  Rate: normal         Dental  No notable dental hx       Pulmonary  Breath sounds clear to auscultation               Abdominal  Abdominal exam normal       Other Findings            Anesthetic Plan    ASA: 2  Anesthesia type: epidural      Post-op pain plan if not by surgeon: indwelling epidural catheter    Induction: Intravenous  Anesthetic plan and risks discussed with: Patient and Spouse

## 2020-05-21 LAB
HCT VFR BLD AUTO: 30.5 % (ref 35.8–46.3)
HGB BLD-MCNC: 9.9 G/DL (ref 11.7–15.4)

## 2020-05-21 PROCEDURE — 75410000003 HC RECOV DEL/VAG/CSECN EA 0.5 HR

## 2020-05-21 PROCEDURE — 36415 COLL VENOUS BLD VENIPUNCTURE: CPT

## 2020-05-21 PROCEDURE — 74011250637 HC RX REV CODE- 250/637: Performed by: OBSTETRICS & GYNECOLOGY

## 2020-05-21 PROCEDURE — 65270000029 HC RM PRIVATE

## 2020-05-21 PROCEDURE — 75410000002 HC LABOR FEE PER 1 HR

## 2020-05-21 PROCEDURE — 76060000078 HC EPIDURAL ANESTHESIA

## 2020-05-21 PROCEDURE — 75410000000 HC DELIVERY VAGINAL/SINGLE

## 2020-05-21 PROCEDURE — 85018 HEMOGLOBIN: CPT

## 2020-05-21 RX ORDER — HYDROCODONE BITARTRATE AND ACETAMINOPHEN 10; 325 MG/1; MG/1
1 TABLET ORAL
Status: DISCONTINUED | OUTPATIENT
Start: 2020-05-21 | End: 2020-05-22 | Stop reason: HOSPADM

## 2020-05-21 RX ORDER — FERROUS GLUCONATE 324(38)MG
1 TABLET ORAL
Status: DISCONTINUED | OUTPATIENT
Start: 2020-05-21 | End: 2020-05-22 | Stop reason: HOSPADM

## 2020-05-21 RX ORDER — HYDROCODONE BITARTRATE AND ACETAMINOPHEN 5; 325 MG/1; MG/1
1 TABLET ORAL ONCE
Status: COMPLETED | OUTPATIENT
Start: 2020-05-21 | End: 2020-05-21

## 2020-05-21 RX ADMIN — IBUPROFEN 800 MG: 800 TABLET, FILM COATED ORAL at 22:08

## 2020-05-21 RX ADMIN — HYDROCODONE BITARTRATE AND ACETAMINOPHEN 1 TABLET: 10; 325 TABLET ORAL at 08:56

## 2020-05-21 RX ADMIN — IBUPROFEN 800 MG: 800 TABLET, FILM COATED ORAL at 08:56

## 2020-05-21 RX ADMIN — HYDROCODONE BITARTRATE AND ACETAMINOPHEN 1 TABLET: 5; 325 TABLET ORAL at 02:16

## 2020-05-21 RX ADMIN — HYDROCODONE BITARTRATE AND ACETAMINOPHEN 1 TABLET: 5; 325 TABLET ORAL at 01:06

## 2020-05-21 RX ADMIN — IBUPROFEN 800 MG: 800 TABLET, FILM COATED ORAL at 16:44

## 2020-05-21 RX ADMIN — FERROUS GLUCONATE 1 TABLET: 324 TABLET ORAL at 12:53

## 2020-05-21 RX ADMIN — HYDROCODONE BITARTRATE AND ACETAMINOPHEN 1 TABLET: 5; 325 TABLET ORAL at 22:08

## 2020-05-21 RX ADMIN — HYDROCODONE BITARTRATE AND ACETAMINOPHEN 1 TABLET: 5; 325 TABLET ORAL at 16:44

## 2020-05-21 RX ADMIN — PRENATAL VIT W/ FE FUMARATE-FA TAB 27-0.8 MG 1 TABLET: 27-0.8 TAB at 08:56

## 2020-05-21 RX ADMIN — FERROUS GLUCONATE 1 TABLET: 324 TABLET ORAL at 16:44

## 2020-05-21 NOTE — PROGRESS NOTES
Infant born on [birth year]2020 at [address] via spontaneous vaginal delivery. Apgars 9/9 at 1 and 5 minutes. Assessment complete. Infant weighed and measured. Vital signs and footprints complete. Vitamin K and Erythromycin given. Cord clamp secure. Infant swaddled and then placed with mother.

## 2020-05-21 NOTE — H&P
Fazal Andrea OB H&P      Assessment/Plan:  Term pregnancy w/ Gest HTN for IOL. Admit for pitocin/AROM anticipating     Problem List  Date Reviewed: 2020          Codes Class    Supervision of high risk pregnancy in third trimester ICD-10-CM: O09.93  ICD-9-CM: V23.9     Overview Addendum 2019  4:39 PM by Ronell Councilman,      H/o PRE-E : BL 24 hr urine. Start  mg daily  H/O PTD  32 weeks  -  Related to Pre- E  Dating US at 10 weeks sets [facility] 20             Hx of preeclampsia, prior pregnancy, currently pregnant, second trimester ICD-10-CM: O09.292  ICD-9-CM: V23.49     Overview Addendum 2020  1:30 PM by Ronell Councilman, DO     No records to review from first pregnancy    Second pregnancy documenting gestational hypertension with out proteinuria not consistent with preeclampsia      Plan Baseline 24-hour urine and start  mg daily . Ca supplement 1000 mg daily             Gestational hypertension ICD-10-CM: O13.9  ICD-9-CM: 642.30     Overview Signed 6/10/2019 12:28 PM by Perla Rosado NP     Last Assessment & Plan:   BP normal to mild  No preeclampsia symptoms. 39 weeks gestation of pregnancy ICD-10-CM: Z3A.39  ICD-9-CM: V22.2         * (Principal) Gestational HTN, third trimester ICD-10-CM: O13.3  ICD-9-CM: 642.33         Encounter for planned induction of labor ICD-10-CM: Z34.90  ICD-9-CM: V22.1         Abdominal pain during pregnancy in third trimester ICD-10-CM: O26.893, R10.9  ICD-9-CM: 646.83, 789.00         Acne vulgaris ICD-10-CM: L70.0  ICD-9-CM: 706.1         Pap smear abnormality of cervix/human papillomavirus (HPV) positive ICD-10-CM: R87.89  ICD-9-CM: 795.09, 079.4     Overview Signed 6/10/2019 12:28 PM by Perla Rosado NP     Overview:   10/11/17 pap Epithelial cell abnormality, ASCUS. HPV positive;  per [facility] records. Repeat pap 2018 Negative per Dr Ekta Valentin office [facility] Everywhere records. Subjective    [place] Risk 21 y.o. G3  39w4d  presented to L&D for IOL for gest HTN at term. Pt has no complaints today. Pt denies vaginal bleeding/discharge. No LOF.  +FM.       OB History    Para Term  AB Living   3 2   2   2   SAB TAB Ectopic Molar Multiple Live Births             2      # Outcome Date GA Lbr Timothy/2nd Weight Sex Delivery Anes PTL Lv   3 Current            2   35w0d   M    CHUY      Complications: Preeclampsia   1   32w0d   M Vag-Spont  N CHUY      Complications: Preeclampsia       Past Medical History:   Diagnosis Date    Gestational hypertension        Past Surgical History:   Procedure Laterality Date    GASTRIC BYPASS,OBESE<150CM NAPOLEON-EN-Y         Family History   Problem Relation Age of Onset    Hypertension Mother     Cancer Maternal Grandmother        Social History     Socioeconomic History    Marital status: SINGLE     Spouse name: Not on file    Number of children: Not on file    Years of education: Not on file    Highest education level: Not on file   Occupational History    Not on file   Social Needs    Financial resource strain: Not on file    Food insecurity     Worry: Not on file     Inability: Not on file    Transportation needs     Medical: Not on file     Non-medical: Not on file   Tobacco Use    Smoking status: Never Smoker    Smokeless tobacco: Never Used   Substance and Sexual Activity    Alcohol use: No    Drug use: No    Sexual activity: Yes     Partners: Male     Birth control/protection: Implant   Lifestyle    Physical activity     Days per week: Not on file     Minutes per session: Not on file    Stress: Not on file   Relationships    Social connections     Talks on phone: Not on file     Gets together: Not on file     Attends Yazdanism service: Not on file     Active member of club or organization: Not on file     Attends meetings of clubs or organizations: Not on file     Relationship status: Not on file    Intimate partner violence     Fear of current or ex partner: Not on file     Emotionally abused: Not on file     Physically abused: Not on file     Forced sexual activity: Not on file   Other Topics Concern     Service Not Asked    Blood Transfusions Not Asked    Caffeine Concern Yes    Occupational Exposure Not Asked    Hobby Hazards Not Asked    Sleep Concern Not Asked    Stress Concern Not Asked    Weight Concern Not Asked    Special Diet Not Asked    Back Care Not Asked    Exercise No    Bike Helmet Not Asked    Seat Belt Yes    Self-Exams Yes   Social History Narrative    Abuse: Feels safe at home, no history of physical abuse, no history of sexual abuse       No Known Allergies      Review of Systems:    Constitutional: No fevers or chills     Prenatal: + fetal movement, no VB/DC, no LOF     CV: No chest pain or palpatations    Resp: No SOB or cough    GI: No nausea/vomiting/diarrhea/constipation    Neuro: No HA, no seizure like activity    Skin: No rashes or lesions     Breast: No breast pain    : No dysuria or hematuria      Prenatal Record Review    The prenatal record has been reviewed.     Prenatal Labs:   No results found for: RUBELLAEXT, GRBSEXT, HBSAGEXT, HIVEXT, RPREXT, GONNOEXT, CHLAMEXT    Objective    Visit Vitals  BP 98/66   Pulse 87   Temp 98.3 °F (36.8 °C)   Resp 18   LMP 09/23/2019 (Exact Date)   SpO2 96%         Obstetric Exam    SVE: 4/50/-2    NST: Baseline 135, reactive NST      Physical Exam    Gen: alert and cooperative, NAD    HEENT: NCAT    CV: RRR    RESP: CTA bilat    ABD: Gravid, soft, NT    EXT: trace edema bilat    NEURO: No focal deficits    SKIN: No noted rashes or lesions       Micha Dangelo, DO  8:00 PM  05/20/20

## 2020-05-21 NOTE — LACTATION NOTE
This note was copied from a baby's chart. Mom called out. Baby awake. Noted baby to have significant nasal congestion. Pink and no signs of distress. Attempted to suction nose with bulb syringe but no return. Burped baby and he settled some. Assisted on left breast in football hold. Everted nipples. Baby took to the breast on first attempt. Latches perfect. Reviewed signs of good latch with mom. Baby nurses actively. Paused some initially in feeding due to nasal congestion most likely but never came off breast. Nasal congestion stopped during feeding. Baby does very well at the breast. Reassured mom that baby latching and feeding well. No medical need to supplement. Keep breastfeeding. Always breastfeed first, supplement after if mom chooses. Mom has her personal use double pump at bedside. Discussed pumping option post nursing or if baby only latching to one breast. Can feed back pumped milk via syringe. Reviewed how to collect colostrum from pump. Mom to call out if she needs help pumping or if she pumps and needs help feeding back pumped milk. Questions answered. Mom reassured.

## 2020-05-21 NOTE — PROGRESS NOTES
Safety Teaching reviewed:   1. Hand hygiene prior to handling the infant. 2. Use of bulb syringe  3. Bracelets with matching numbers are placed on mother and infant  3. An infant security tag  [facility]) is placed on the infant's ankle and monitored  5. All OB nurses wear pink Employee badges - do not give your baby to anyone without proper identification. 6. Never leave the baby alone in the room. 7. The infant should be placed on their back to sleep. on a firm mattress. No toys should be placed in the crib. (safe sleep video offered to view)  8. Never shake the baby (video offered to view)  9. Infant fall prevention - do not sleep with the baby, and place the baby in the crib while ambulating. 8. Mother and Baby Care booklet given to Mother.

## 2020-05-21 NOTE — PROGRESS NOTES
Called Dr. Mariza Saleem to inform him of the patients complaint of pain at this time. Informed him of the patients recent medication given as of now.  New orders received SEE ORDERS

## 2020-05-21 NOTE — L&D DELIVERY NOTE
Delivery Summary    Patient: Amber Lara MRN: [id number]  SSN: [id number]    YOB: 1995  Age: 21 y.o. Sex: female       Information for the patient's :  Claudell Dash [[id number]]       Labor Events:    Labor: No    Steroids: None   Cervical Ripening Date/Time:       Cervical Ripening Type: None   Antibiotics During Labor: Yes   Rupture Identifier:      Rupture Date/Time: 2020 12:19 PM   Rupture Type: AROM   Amniotic Fluid Volume:      Amniotic Fluid Description: Clear    Amniotic Fluid Odor:      Induction: Oxytocin       Induction Date/Time: 2020 8:45 AM    Indications for Induction: Elective    Augmentation:     Augmentation Date/Time:      Indications for Augmentation:     Labor complications: Additional complications:        Delivery Events:  Indications For Episiotomy:     Episiotomy:     Perineal Laceration(s):     Repaired:     Periurethral Laceration Location:      Repaired:     Labial Laceration Location:     Repaired:     Sulcal Laceration Location:     Repaired:     Vaginal Laceration Location:     Repaired:     Cervical Laceration Location:     Repaired:     Repair Suture:     Number of Repair Packets:     Estimated Blood Loss (ml):  ml   Quantitative Blood Loss (ml)                Delivery Date: 2020    Delivery Time: 7:48 PM  Delivery Type: Vaginal, Spontaneous  Sex:  Male    Gestational Age: 43w3d   Delivery Clinician:  Jani Solomon  Living Status:     Delivery Location:              APGARS  One minute Five minutes Ten minutes   Skin color:            Heart rate:            Grimace:            Muscle tone:            Breathing: Totals:                Presentation: Vertex    Position:        Resuscitation Method:        Meconium Stained:        Cord Information:    Complications:    Cord around:    Delayed cord clamping?     Cord clamped date/time:   Disposition of Cord Blood:      Blood Gases Sent?: Placenta:  Date/Time: 2020  7:52 PM  Removal: Spontaneous      Appearance: Normal     [place] Measurements:  Birth Weight:        Birth Length:        Head Circumference:        Chest Circumference:       Abdominal Girth: Other Providers:   Thomas PAINTING;OMER RODRIGUEZ;FAVIOLA MUÑOZ;;;;;;;;Maged CALLAHAN, Obstetrician;Primary Nurse;Primary  Nurse;Nicu Nurse;Neonatologist;Anesthesiologist;Crna;Nurse Practitioner;Midwife;Nursery Nurse;Scrub Tech           Group B Strep: No results found for: GRBSEXT, GRBSEXT  Information for the patient's :  Alex Madrid [[id number]]   No results found for: ABORH, PCTABR, PCTDIG, BILI, ABORHEXT, ABORH    No results for input(s): PCO2CB, PO2CB, HCO3I, SO2I, IBD, PTEMPI, SPECTI, PHICB, ISITE, IDEV, IALLEN in the last 72 hours. Present for entire delivery. Details in delivery summary. . Viable Male Infant, APGARS 8,9 .   Weight 7 lbs. , 12 oz. EBL:  150 mL  Pain mgmt:   CASI    Placenta spont, intact, 3VC. no lacerations    Pt and infant stable.         Abdirashid Sapp DO     8:03 PM    20

## 2020-05-21 NOTE — PROGRESS NOTES
SBAR IN Report: Mother    Verbal report received from Demond Heller RN (full name & credentials) on this patient, who is now being transferred from L&D (unit) for routine progression of care. The patient is not wearing a green \"Anesthesia-Duramorph\" band. Report consisted of patient's Situation, Background, Assessment and Recommendations (SBAR).  ID bands were compared with the identification form, and verified with the patient and transferring nurse. Information from the SBAR and K[employer] and the [employer] Report was reviewed with the transferring nurse; opportunity for questions and clarification provided.

## 2020-05-21 NOTE — LACTATION NOTE
This note was copied from a baby's chart. 3rd time mom but first time to breastfeed. Mom states baby latching well to the breast but mom worried that [place] not getting anything\". Reviewed breastfeeding expectations. Normal feeding patterns, feeding cues and feeding on demand. Supply and demand discussed. No medical need to supplement if baby latching well to the breast at all feeds. Has used her personal pump a couple times also and did collect some colostrum. Reviewed normal colostrum volume expectations. Baby not yet 24 hours old. Encouraged mom to stimulate the breasts at all feeds, via nursing and/or pumping. Mom agreeable. Mom wants to shower now but will call out for lactation consultant at next feeding for observation and assistance with latching.

## 2020-05-21 NOTE — PROGRESS NOTES
SBAR OUT Report: Mother    Verbal report given to Nicky Rachel RN (full name & credentials) on this patient, who is now being transferred to MIU (unit) for routine progression of care. The patient is not wearing a green \"Anesthesia-Duramorph\" band. Report consisted of patient's Situation, Background, Assessment and Recommendations (SBAR).  ID bands were compared with the identification form, and verified with the patient and receiving nurse. Information from the SBAR, Intake/Output and MAR and the [employer] Report was reviewed with the receiving nurse; opportunity for questions and clarification provided.     Fundal assessment completed with oncoming RN

## 2020-05-21 NOTE — PROGRESS NOTES
Patient is S/P vaginal delivery at 39 weeks. No complaints today. Lochia < menses. No GI/ issues. No F/C. Visit Vitals  /70 (BP 1 Location: Left arm, BP Patient Position: At rest;Sitting)   Pulse 71   Temp 97.3 °F (36.3 °C)   Resp 16   SpO2 99%   Breastfeeding Unknown        CV - RRR  LUNGS - CTA bilaterally  ABD - soft, approp tend, FF below umbilicus  EXT - tr edema bilaterally          Labs:    Recent Results (from the past 24 hour(s))   POC CG8I    Collection Time: 20  8:01 PM   Result Value Ref Range    Device: ROOM AIR      FIO2 (POC) 21 %    pH (POC) 7.238 (LL) 7.35 - 7.45      pCO2 (POC) 50.1 (H) 35 - 45 MMHG    pO2 (POC) 19 (LL) 75 - 100 MMHG    HCO3 (POC) 21.3 (L) 22 - 26 MMOL/L    sO2 (POC) 22 (L) 95 - 98 %    Base deficit (POC) 7 mmol/L    Allens test (POC) NOT APPLICABLE      Site CORD      Specimen type (POC) ARTERIAL CORD      Sodium,  136 - 145 MMOL/L    Potassium, POC 5.0 3.5 - 5.1 MMOL/L    Glucose, POC 85 65 - 100 MG/DL    Calcium, ionized (POC) 1.57 (H) 1.12 - 1.32 mmol/L    Performed by Will)RT     COLLECT TIME 1,948     POC CG8I    Collection Time: 20  8:04 PM   Result Value Ref Range    Device: ROOM AIR      FIO2 (POC) 21 %    pH (POC) 7.260 (L) 7.35 - 7.45      pCO2 (POC) 49.0 (H) 35 - 45 MMHG    pO2 (POC) 17 (LL) 75 - 100 MMHG    HCO3 (POC) 22.0 22 - 26 MMOL/L    sO2 (POC) 19 (L) 95 - 98 %    Base deficit (POC) 5 mmol/L    Allens test (POC) NOT APPLICABLE      Site CORD      Specimen type (POC) VENOUS CORD      Sodium,  136 - 145 MMOL/L    Potassium, POC 5.1 3.5 - 5.1 MMOL/L    Glucose, POC 86 65 - 100 MG/DL    Calcium, ionized (POC) 1.70 (H) 1.12 - 1.32 mmol/L    Performed by Will)RT     COLLECT TIME 1,948     HGB & HCT    Collection Time: 20  5:33 AM   Result Value Ref Range    HGB 9.9 (L) 11.7 - 15.4 g/dL    HCT 30.5 (L) 35.8 - 46.3 %         PPD #1      Pt is breast feeding l. Stable.   Routine PP care      Andi Novoa Mike Gamez DO  9:11 AM  05/21/20

## 2020-05-22 VITALS
HEART RATE: 73 BPM | RESPIRATION RATE: 16 BRPM | OXYGEN SATURATION: 100 % | DIASTOLIC BLOOD PRESSURE: 70 MMHG | TEMPERATURE: 98.2 F | SYSTOLIC BLOOD PRESSURE: 115 MMHG

## 2020-05-22 PROCEDURE — 74011250637 HC RX REV CODE- 250/637: Performed by: OBSTETRICS & GYNECOLOGY

## 2020-05-22 RX ORDER — FERROUS GLUCONATE 324(38)MG
325 TABLET ORAL
Qty: 90 TAB | Refills: 2 | Status: SHIPPED | OUTPATIENT
Start: 2020-05-22 | End: 2020-05-28 | Stop reason: ALTCHOICE

## 2020-05-22 RX ORDER — IBUPROFEN 800 MG/1
800 TABLET ORAL
Qty: 30 TAB | Refills: 0 | Status: SHIPPED | OUTPATIENT
Start: 2020-05-22 | End: 2020-05-28 | Stop reason: ALTCHOICE

## 2020-05-22 RX ADMIN — FERROUS GLUCONATE 1 TABLET: 324 TABLET ORAL at 09:00

## 2020-05-22 RX ADMIN — IBUPROFEN 800 MG: 800 TABLET, FILM COATED ORAL at 09:00

## 2020-05-22 RX ADMIN — HYDROCODONE BITARTRATE AND ACETAMINOPHEN 1 TABLET: 5; 325 TABLET ORAL at 04:24

## 2020-05-22 RX ADMIN — PRENATAL VIT W/ FE FUMARATE-FA TAB 27-0.8 MG 1 TABLET: 27-0.8 TAB at 09:00

## 2020-05-22 NOTE — PROGRESS NOTES
05/21/20 2208   Pain 1   Pain Scale 1 Numeric (0 - 10)   Pain Intensity 1 6   Patient Stated Pain Goal 4   Pain Location 1 Abdomen   Pain Orientation 1 Lower   Pain Description 1 Cramping   Pain Intervention(s) 1 Medication (see MAR)   Patient Observation   Observations motrin and norco given for pain   Activity Family/Visitors present; In bed

## 2020-05-22 NOTE — DISCHARGE SUMMARY
Date of Admission:  2020  6:33 AM  Date of Discharge:   2020    Patient Active Problem List   Diagnosis Code    Acne vulgaris L70.0    Pap smear abnormality of cervix/human papillomavirus (HPV) positive R87.89    Gestational hypertension O13.9    Supervision of high risk pregnancy in third trimester O09.93    Hx of preeclampsia, prior pregnancy, currently pregnant, second trimester O09.292    Abdominal pain during pregnancy in third trimester O26.893, R10.9    39 weeks gestation of pregnancy Z3A.39    Gestational HTN, third trimester O13.3    Encounter for planned induction of labor Z34.90       Emerson Leisure 21 y.o. G3  presented at 39w4d for induction. Pt had  without incident. See delivery note for all delivery details. Pt's PP course was uneventful. On day of D/C, she was ambulating well, afebrile, with lochia < menses. She was discharged home with medications as below. Pt was breast/bottle feeding on discharge. Routine PP instructions given to patient. She is to follow up with [facility] in 6 weeks for PP exam.    Current Discharge Medication List      START taking these medications    Details   ferrous gluconate 324 mg (38 mg iron) tablet Take 1 Tab by mouth three (3) times daily (with meals). Qty: 90 Tab, Refills: 2      ibuprofen (MOTRIN) 800 mg tablet Take 1 Tab by mouth every eight (8) hours as needed for Pain. Qty: 30 Tab, Refills: 0         CONTINUE these medications which have NOT CHANGED    Details   prenatal multivit-ca-min-fe-fa tab Take  by mouth.          STOP taking these medications       aspirin 81 mg chewable tablet Comments:   Reason for Stopping:               Isha Huerta DO  11:03 AM  20

## 2020-05-22 NOTE — PROGRESS NOTES
05/22/20 0424   Pain 1   Pain Scale 1 Numeric (0 - 10)   Pain Intensity 1 5   Patient Stated Pain Goal 4   Pain Location 1 Abdomen   Pain Orientation 1 Lower   Pain Description 1 Cramping   Pain Intervention(s) 1 Medication (see MAR)   Patient Observation   Observations norco given for pain   Activity In bed;Family/Visitors present

## 2020-05-22 NOTE — PROGRESS NOTES
Chart reviewed - no needs identified. SW met with patient who denies any history of postpartum depression/anxiety. Patient given informational packet on  mood & anxiety disorders (resources/education). Family denies any additional needs from  at this time. Family has 's contact information should any needs/questions arise.     MAYURI Garvey [place]   [phone]

## 2020-05-22 NOTE — DISCHARGE INSTRUCTIONS
Patient Education   Discharge instruction to follow: Activity: Pelvis rest for 6 weeks     No heavy lifting over 15 lbs for 2 weeks     No driving for 2 weeks     No push/pull motion such as sweeping or vacuuming for 2 weeks     No tub baths for 6 weeks    Continue using the hygenique wand after each void or bowel movement. If using sitz bath continue until comfortable stopping. If using vladislav-bottle continue to use until comfortable stopping. Change sanitary pad after each urination or bowel movement. Call MD for the following:      Fever over 101 F; pain not relieved by medication; foul smelling vaginal discharge or an increase in vaginal bleeding. Take medication as prescribed. Follow up with MD as order. Vaginal Childbirth: Care Instructions  Your Care Instructions    Your body will slowly heal in the next few weeks. It is easy to get too tired and overwhelmed during the first weeks after your baby is born. Changes in your hormones can shift your mood without warning. You may find it hard to meet the extra demands on your energy and time. Take it easy on yourself. Follow-up care is a key part of your treatment and safety. Be sure to make and go to all appointments, and call your doctor if you are having problems. It's also a good idea to know your test results and keep a list of the medicines you take. How can you care for yourself at home? · Vaginal bleeding and cramps  ? After delivery, you will have a bloody discharge from the vagina. This will turn pink within a week and then white or yellow after about 10 days. It may last for 2 to 4 weeks or longer, until the uterus has healed. Use pads instead of tampons until you stop bleeding. ? Do not worry if you pass some blood clots, as long as they are smaller than a golf ball. If you have a tear or stitches in your vaginal area, change the pad at least every 4 hours to prevent soreness and infection. ?  You may have cramps for the first few days after childbirth. These are normal and occur as the uterus shrinks to normal size. Take an over-the-counter pain medicine, such as acetaminophen (Tylenol), ibuprofen (Advil, Motrin), or naproxen (Aleve), for cramps. Read and follow all instructions on the label. Do not take aspirin, because it can cause more bleeding. ? Do not take two or more pain medicines at the same time unless the doctor told you to. Many pain medicines have acetaminophen, which is Tylenol. Too much acetaminophen (Tylenol) can be harmful. · Stitches  ? If you have stitches, they will dissolve on their own and do not need to be removed. Follow your doctor's instructions for cleaning the stitched area. ? Put ice or a cold pack on your painful area for 10 to 20 minutes at a time, several times a day, for the first few days. Put a thin cloth between the ice and your skin. ? Sit in a few inches of warm water (sitz bath) 3 times a day and after bowel movements. The warm water helps with pain and itching. If you do not have a tub, a warm shower might help. · Breast fullness  ? Your breasts may overfill (engorge) in the first few days after delivery. To help milk flow and to relieve pain, warm your breasts in the shower or by using warm, moist towels before nursing. ? If you are not nursing, do not put warmth on your breasts or touch your breasts. Wear a tight bra or sports bra and use ice until the fullness goes away. This usually takes 2 to 3 days. ? Put ice or a cold pack on your breast after nursing to reduce swelling and pain. Put a thin cloth between the ice and your skin. · Activity  ? Eat a balanced diet. Do not try to lose weight by cutting calories. Keep taking your prenatal vitamins, or take a multivitamin. ? Get as much rest as you can. Try to take naps when your baby sleeps during the day. ? Get some exercise every day. But do not do any heavy exercise until your doctor says it is okay.   ? Wait until you are healed (about 4 to 6 weeks) before you have sexual intercourse. Your doctor will tell you when it is okay to have sex. ? Talk to your doctor about birth control. You can get pregnant even before your period returns. Also, you can get pregnant while you are breastfeeding. · Mental health  ? It is normal to have some sadness, anxiety, sleeplessness, and mood swings after you go home. If you feel upset or hopeless for more than a few days or are having trouble doing the things you need to do, talk to your doctor. · Constipation and hemorrhoids  ? Drink plenty of fluids, enough so that your urine is light yellow or clear like water. If you have kidney, heart, or liver disease and have to limit fluids, talk with your doctor before you increase the amount of fluids you drink. ? Eat plenty of fiber each day. Have a bran muffin or bran cereal for breakfast, and try eating a piece of fruit for a mid-afternoon snack. ? For painful, itchy hemorrhoids, put ice or a cold pack on the area several times a day for 10 minutes at a time. Follow this by putting a warm compress on the area for another 10 to 20 minutes or by sitting in a shallow, warm bath. When should you call for help? Call  911 anytime you think you may need emergency care. For example, call if:    · You have thoughts of harming yourself, your baby, or another person.     · You passed out (lost consciousness).     · You have chest pain, are short of breath, or cough up blood.     · You have a seizure.    Call your doctor now or seek immediate medical care if:    · You have severe vaginal bleeding.     · You are dizzy or lightheaded, or you feel like you may faint.     · You have a fever.     · You have new or more pain in your belly or pelvis.     · You have symptoms of a blood clot in your leg (called a deep vein thrombosis), such as:  ? Pain in the calf, back of the knee, thigh, or groin. ?  Redness and swelling in your leg or groin.     · You have signs of preeclampsia, such as:  ? Sudden swelling of your face, hands, or feet. ? New vision problems (such as dimness, blurring, or seeing spots). ? A severe headache.    Watch closely for changes in your health, and be sure to contact your doctor if:    · Your vaginal bleeding seems to be getting heavier.     · You have new or worse vaginal discharge.     · You feel sad, anxious, or hopeless for more than a few days.     · You do not get better as expected. Where can you learn more? Go to http://karli-ervin.info/  Enter Q237 in the search box to learn more about \"Vaginal Childbirth: Care Instructions. \"  Current as of: May 29, 2019Content Version: 12.4  © 4484-8132 [employer]. Care instructions adapted under license by [employer] (which disclaims liability or warranty for this information). If you have questions about a medical condition or this instruction, always ask your healthcare professional. [address] any warranty or liability for your use of this information.

## 2020-05-22 NOTE — LACTATION NOTE
Mom and baby are going home today. Continue to offer the breast without restriction. Mom's milk should be fully in over the next few days. Reviewed engorgement precautions. Hand Expression has been demoed and written hand-out reviewed. As milk comes in baby will be more alert at the breast and swallows will be more obvious. Breasts may feel softer once baby has finished nursing. Baby should be back to birth weight by 3weeks of age. And then gain on average 1 oz per day for the next 2-3 months. Reviewed babies should be exclusively breastfeeding for the first 6 months and that breastfeeding should continue after introduction of appropriate complimentary foods after 6 months. Initial output should be at least 1 wet and 1 bowel movement for each day old baby is. By day 5-7 once milk is fully in baby will consistently have 6 or more soaking wet diapers and about 4 bowel movement. Some babies have a bowel movement with every feeding and some have 1-3 large bowel movements each day. Inadequate output may indicate inadequate feedings and should be reported to your Pediatrician. Bowel habits may change as baby gets older. Encouraged follow-up at Pediatrician in 1-2 days, no later than 1 week of age. Call [facility] for any questions as needed or to set up an OP visit. OP phone calls are returned within 24 hours. Community Breastfeeding Resource List given. Mom doing breast and bottle feeding. Formula supplement started last night per her request. Discussed supply and demand of breastmilk. No medical need for formula if baby latching and feeding well at the breast. Can also use breastpump. Keep feeding on the breast at all feeds, formula supplement ad gary. Mom agreeable. No needs or questions prior to discharge.

## 2020-05-22 NOTE — PROGRESS NOTES
Discharge teaching complete. Mother verbalized understanding, questions encouraged. Cabins sheet signed.

## 2021-04-14 PROBLEM — O13.9 GESTATIONAL HYPERTENSION: Status: RESOLVED | Noted: 2017-06-27 | Resolved: 2021-04-14

## 2021-04-14 PROBLEM — R87.612 LGSIL ON PAP SMEAR OF CERVIX: Status: ACTIVE | Noted: 2021-04-14

## 2021-04-14 PROBLEM — Z3A.39 39 WEEKS GESTATION OF PREGNANCY: Status: RESOLVED | Noted: 2020-05-20 | Resolved: 2021-04-14

## 2021-04-14 PROBLEM — Z30.09 BIRTH CONTROL COUNSELING: Status: ACTIVE | Noted: 2021-04-14

## 2021-04-14 PROBLEM — O09.93 SUPERVISION OF HIGH RISK PREGNANCY IN THIRD TRIMESTER: Status: RESOLVED | Noted: 2019-12-12 | Resolved: 2021-04-14

## 2021-04-14 PROBLEM — R10.9 ABDOMINAL PAIN DURING PREGNANCY IN THIRD TRIMESTER: Status: RESOLVED | Noted: 2020-05-03 | Resolved: 2021-04-14

## 2021-04-14 PROBLEM — Z34.90 ENCOUNTER FOR PLANNED INDUCTION OF LABOR: Status: RESOLVED | Noted: 2020-05-20 | Resolved: 2021-04-14

## 2021-04-14 PROBLEM — O26.893 ABDOMINAL PAIN DURING PREGNANCY IN THIRD TRIMESTER: Status: RESOLVED | Noted: 2020-05-03 | Resolved: 2021-04-14

## 2021-04-14 PROBLEM — O09.292 HX OF PREECLAMPSIA, PRIOR PREGNANCY, CURRENTLY PREGNANT, SECOND TRIMESTER: Status: RESOLVED | Noted: 2019-12-12 | Resolved: 2021-04-14

## 2021-04-14 PROBLEM — R30.0 DYSURIA: Status: ACTIVE | Noted: 2021-04-14

## 2021-04-14 PROBLEM — O13.3 GESTATIONAL HTN, THIRD TRIMESTER: Status: RESOLVED | Noted: 2020-05-20 | Resolved: 2021-04-14

## 2021-08-09 PROBLEM — R30.0 DIFFICULT OR PAINFUL URINATION: Status: ACTIVE | Noted: 2021-03-09

## 2021-08-09 PROBLEM — E66.9 OBESITY: Status: ACTIVE | Noted: 2021-06-23

## 2022-03-18 PROBLEM — R87.618 PAP SMEAR ABNORMALITY OF CERVIX/HUMAN PAPILLOMAVIRUS (HPV) POSITIVE: Status: ACTIVE | Noted: 2018-08-06

## 2022-03-18 PROBLEM — R30.0 DYSURIA: Status: ACTIVE | Noted: 2021-04-14

## 2022-03-19 PROBLEM — L70.0 ACNE VULGARIS: Status: ACTIVE | Noted: 2018-10-24

## 2022-03-19 PROBLEM — E66.9 OBESITY: Status: ACTIVE | Noted: 2021-06-23

## 2022-03-19 PROBLEM — R30.0 DIFFICULT OR PAINFUL URINATION: Status: ACTIVE | Noted: 2021-03-09

## 2022-03-19 PROBLEM — Z30.09 BIRTH CONTROL COUNSELING: Status: ACTIVE | Noted: 2021-04-14

## 2022-03-19 PROBLEM — R87.612 LGSIL ON PAP SMEAR OF CERVIX: Status: ACTIVE | Noted: 2021-04-14

## 2022-12-06 ENCOUNTER — APPOINTMENT (RX ONLY)
Dept: URBAN - METROPOLITAN AREA CLINIC 329 | Facility: CLINIC | Age: 27
Setting detail: DERMATOLOGY
End: 2022-12-06

## 2022-12-06 DIAGNOSIS — L81.0 POSTINFLAMMATORY HYPERPIGMENTATION: ICD-10-CM | Status: UNCHANGED

## 2022-12-06 DIAGNOSIS — L70.0 ACNE VULGARIS: ICD-10-CM | Status: INADEQUATELY CONTROLLED

## 2022-12-06 PROCEDURE — ? PRESCRIPTION

## 2022-12-06 PROCEDURE — ? PRESCRIPTION MEDICATION MANAGEMENT

## 2022-12-06 PROCEDURE — ? ADDITIONAL NOTES

## 2022-12-06 PROCEDURE — 99204 OFFICE O/P NEW MOD 45 MIN: CPT

## 2022-12-06 PROCEDURE — ? COUNSELING

## 2022-12-06 PROCEDURE — ? FULL BODY SKIN EXAM - DECLINED

## 2022-12-06 RX ORDER — CLINDAMYCIN PHOSPHATE 10 MG/G
GEL TOPICAL
Qty: 60 | Refills: 3 | Status: ERX | COMMUNITY
Start: 2022-12-06

## 2022-12-06 RX ORDER — TRETIONIN 0.25 MG/G
CREAM TOPICAL
Qty: 45 | Refills: 5 | Status: ERX | COMMUNITY
Start: 2022-12-06

## 2022-12-06 RX ADMIN — TRETIONIN: 0.25 CREAM TOPICAL at 00:00

## 2022-12-06 RX ADMIN — CLINDAMYCIN PHOSPHATE: 10 GEL TOPICAL at 00:00

## 2022-12-06 ASSESSMENT — LOCATION DETAILED DESCRIPTION DERM
LOCATION DETAILED: LEFT INFERIOR CENTRAL MALAR CHEEK
LOCATION DETAILED: RIGHT INFERIOR CENTRAL MALAR CHEEK
LOCATION DETAILED: LEFT CENTRAL MALAR CHEEK

## 2022-12-06 ASSESSMENT — LOCATION ZONE DERM: LOCATION ZONE: FACE

## 2022-12-06 ASSESSMENT — LOCATION SIMPLE DESCRIPTION DERM
LOCATION SIMPLE: RIGHT CHEEK
LOCATION SIMPLE: LEFT CHEEK

## 2022-12-06 NOTE — PROCEDURE: PRESCRIPTION MEDICATION MANAGEMENT
Initiate Treatment: Tretinoin 0.025% cream pea sized amount to entire face once nightly\\n\\nClindamycin gel to affected areas for spot treatment.
Detail Level: Zone
Render In Strict Bullet Format?: No

## 2022-12-06 NOTE — PROCEDURE: ADDITIONAL NOTES
Additional Notes: Reassured patient this will fade with time.
Render Risk Assessment In Note?: no
Detail Level: Simple
Additional Notes: Patient consent was obtained to proceed with the visit and recommended plan of care after discussion of all risks and benefits, including the risks of COVID-19 exposure.

## 2022-12-06 NOTE — PROCEDURE: COUNSELING
Benzoyl Peroxide Pregnancy And Lactation Text: This medication is Pregnancy Category C. It is unknown if benzoyl peroxide is excreted in breast milk.
Erythromycin Pregnancy And Lactation Text: This medication is Pregnancy Category B and is considered safe during pregnancy. It is also excreted in breast milk.
Birth Control Pills Counseling: Birth Control Pill Counseling: I discussed with the patient the potential side effects of OCPs including but not limited to increased risk of stroke, heart attack, thrombophlebitis, deep venous thrombosis, hepatic adenomas, breast changes, GI upset, headaches, and depression.  The patient verbalized understanding of the proper use and possible adverse effects of OCPs. All of the patient's questions and concerns were addressed.
Topical Sulfur Applications Counseling: Topical Sulfur Counseling: Patient counseled that this medication may cause skin irritation or allergic reactions.  In the event of skin irritation, the patient was advised to reduce the amount of the drug applied or use it less frequently.   The patient verbalized understanding of the proper use and possible adverse effects of topical sulfur application.  All of the patient's questions and concerns were addressed.
Use Enhanced Medication Counseling?: No
Sarecycline Counseling: Patient advised regarding possible photosensitivity and discoloration of the teeth, skin, lips, tongue and gums.  Patient instructed to avoid sunlight, if possible.  When exposed to sunlight, patients should wear protective clothing, sunglasses, and sunscreen.  The patient was instructed to call the office immediately if the following severe adverse effects occur:  hearing changes, easy bruising/bleeding, severe headache, or vision changes.  The patient verbalized understanding of the proper use and possible adverse effects of sarecycline.  All of the patient's questions and concerns were addressed.
Aklief Pregnancy And Lactation Text: It is unknown if this medication is safe to use during pregnancy.  It is unknown if this medication is excreted in breast milk.  Breastfeeding women should use the topical cream on the smallest area of the skin for the shortest time needed while breastfeeding.  Do not apply to nipple and areola.
Topical Clindamycin Counseling: Patient counseled that this medication may cause skin irritation or allergic reactions.  In the event of skin irritation, the patient was advised to reduce the amount of the drug applied or use it less frequently.   The patient verbalized understanding of the proper use and possible adverse effects of clindamycin.  All of the patient's questions and concerns were addressed.
Azelaic Acid Pregnancy And Lactation Text: This medication is considered safe during pregnancy and breast feeding.
Doxycycline Pregnancy And Lactation Text: This medication is Pregnancy Category D and not consider safe during pregnancy. It is also excreted in breast milk but is considered safe for shorter treatment courses.
Minocycline Counseling: Patient advised regarding possible photosensitivity and discoloration of the teeth, skin, lips, tongue and gums.  Patient instructed to avoid sunlight, if possible.  When exposed to sunlight, patients should wear protective clothing, sunglasses, and sunscreen.  The patient was instructed to call the office immediately if the following severe adverse effects occur:  hearing changes, easy bruising/bleeding, severe headache, or vision changes.  The patient verbalized understanding of the proper use and possible adverse effects of minocycline.  All of the patient's questions and concerns were addressed.
Bactrim Counseling:  I discussed with the patient the risks of sulfa antibiotics including but not limited to GI upset, allergic reaction, drug rash, diarrhea, dizziness, photosensitivity, and yeast infections.  Rarely, more serious reactions can occur including but not limited to aplastic anemia, agranulocytosis, methemoglobinemia, blood dyscrasias, liver or kidney failure, lung infiltrates or desquamative/blistering drug rashes.
Tetracycline Pregnancy And Lactation Text: This medication is Pregnancy Category D and not consider safe during pregnancy. It is also excreted in breast milk.
Tazorac Counseling:  Patient advised that medication is irritating and drying.  Patient may need to apply sparingly and wash off after an hour before eventually leaving it on overnight.  The patient verbalized understanding of the proper use and possible adverse effects of tazorac.  All of the patient's questions and concerns were addressed.
Winlevi Pregnancy And Lactation Text: This medication is considered safe during pregnancy and breastfeeding.
Spironolactone Pregnancy And Lactation Text: This medication can cause feminization of the male fetus and should be avoided during pregnancy. The active metabolite is also found in breast milk.
Azithromycin Counseling:  I discussed with the patient the risks of azithromycin including but not limited to GI upset, allergic reaction, drug rash, diarrhea, and yeast infections.
Dapsone Pregnancy And Lactation Text: This medication is Pregnancy Category C and is not considered safe during pregnancy or breast feeding.
High Dose Vitamin A Counseling: Side effects reviewed, pt to contact office should one occur.
Topical Sulfur Applications Pregnancy And Lactation Text: This medication is Pregnancy Category C and has an unknown safety profile during pregnancy. It is unknown if this topical medication is excreted in breast milk.
Topical Retinoid counseling:  Patient advised to apply a pea-sized amount only at bedtime and wait 30 minutes after washing their face before applying.  If too drying, patient may add a non-comedogenic moisturizer. The patient verbalized understanding of the proper use and possible adverse effects of retinoids.  All of the patient's questions and concerns were addressed.
Isotretinoin Counseling: Patient should get monthly blood tests, not donate blood, not drive at night if vision affected, not share medication, and not undergo elective surgery for 6 months after tx completed. Side effects reviewed, pt to contact office should one occur.
Birth Control Pills Pregnancy And Lactation Text: This medication should be avoided if pregnant and for the first 30 days post-partum.
Benzoyl Peroxide Counseling: Patient counseled that medicine may cause skin irritation and bleach clothing.  In the event of skin irritation, the patient was advised to reduce the amount of the drug applied or use it less frequently.   The patient verbalized understanding of the proper use and possible adverse effects of benzoyl peroxide.  All of the patient's questions and concerns were addressed.
Erythromycin Counseling:  I discussed with the patient the risks of erythromycin including but not limited to GI upset, allergic reaction, drug rash, diarrhea, increase in liver enzymes, and yeast infections.
Bactrim Pregnancy And Lactation Text: This medication is Pregnancy Category D and is known to cause fetal risk.  It is also excreted in breast milk.
Topical Clindamycin Pregnancy And Lactation Text: This medication is Pregnancy Category B and is considered safe during pregnancy. It is unknown if it is excreted in breast milk.
Aklief counseling:  Patient advised to apply a pea-sized amount only at bedtime and wait 30 minutes after washing their face before applying.  If too drying, patient may add a non-comedogenic moisturizer.  The most commonly reported side effects including irritation, redness, scaling, dryness, stinging, burning, itching, and increased risk of sunburn.  The patient verbalized understanding of the proper use and possible adverse effects of retinoids.  All of the patient's questions and concerns were addressed.
Tazorac Pregnancy And Lactation Text: This medication is not safe during pregnancy. It is unknown if this medication is excreted in breast milk.
Azelaic Acid Counseling: Patient counseled that medicine may cause skin irritation and to avoid applying near the eyes.  In the event of skin irritation, the patient was advised to reduce the amount of the drug applied or use it less frequently.   The patient verbalized understanding of the proper use and possible adverse effects of azelaic acid.  All of the patient's questions and concerns were addressed.
Detail Level: Zone
Tetracycline Counseling: Patient counseled regarding possible photosensitivity and increased risk for sunburn.  Patient instructed to avoid sunlight, if possible.  When exposed to sunlight, patients should wear protective clothing, sunglasses, and sunscreen.  The patient was instructed to call the office immediately if the following severe adverse effects occur:  hearing changes, easy bruising/bleeding, severe headache, or vision changes.  The patient verbalized understanding of the proper use and possible adverse effects of tetracycline.  All of the patient's questions and concerns were addressed. Patient understands to avoid pregnancy while on therapy due to potential birth defects.
Azithromycin Pregnancy And Lactation Text: This medication is considered safe during pregnancy and is also secreted in breast milk.
Doxycycline Counseling:  Patient counseled regarding possible photosensitivity and increased risk for sunburn.  Patient instructed to avoid sunlight, if possible.  When exposed to sunlight, patients should wear protective clothing, sunglasses, and sunscreen.  The patient was instructed to call the office immediately if the following severe adverse effects occur:  hearing changes, easy bruising/bleeding, severe headache, or vision changes.  The patient verbalized understanding of the proper use and possible adverse effects of doxycycline.  All of the patient's questions and concerns were addressed.
High Dose Vitamin A Pregnancy And Lactation Text: High dose vitamin A therapy is contraindicated during pregnancy and breast feeding.
Topical Retinoid Pregnancy And Lactation Text: This medication is Pregnancy Category C. It is unknown if this medication is excreted in breast milk.
Winlevi Counseling:  I discussed with the patient the risks of topical clascoterone including but not limited to erythema, scaling, itching, and stinging. Patient voiced their understanding.
Spironolactone Counseling: Patient advised regarding risks of diarrhea, abdominal pain, hyperkalemia, birth defects (for female patients), liver toxicity and renal toxicity. The patient may need blood work to monitor liver and kidney function and potassium levels while on therapy. The patient verbalized understanding of the proper use and possible adverse effects of spironolactone.  All of the patient's questions and concerns were addressed.
Isotretinoin Pregnancy And Lactation Text: This medication is Pregnancy Category X and is considered extremely dangerous during pregnancy. It is unknown if it is excreted in breast milk.
Dapsone Counseling: I discussed with the patient the risks of dapsone including but not limited to hemolytic anemia, agranulocytosis, rashes, methemoglobinemia, kidney failure, peripheral neuropathy, headaches, GI upset, and liver toxicity.  Patients who start dapsone require monitoring including baseline LFTs and weekly CBCs for the first month, then every month thereafter.  The patient verbalized understanding of the proper use and possible adverse effects of dapsone.  All of the patient's questions and concerns were addressed.

## 2022-12-06 NOTE — HPI: PIMPLES (ACNE)
How Severe Is Your Acne?: moderate
Is This A New Presentation, Or A Follow-Up?: Acne
Additional Comments (Use Complete Sentences): Patient states she has been struggling with acne on her face for several years. She has been using tretinoin for about a year and still flares from time to time. Patient states it will leave hyperpigmentation on her cheek afterwards. Patient states that she has seen some improvement since starting the topical but states she can only use it every other day.

## 2023-05-12 ENCOUNTER — HOSPITAL ENCOUNTER (EMERGENCY)
Age: 28
Discharge: HOME | End: 2023-05-12
Attending: EMERGENCY MEDICINE
Payer: COMMERCIAL

## 2023-05-12 ENCOUNTER — APPOINTMENT (OUTPATIENT)
Dept: GENERAL RADIOLOGY | Age: 28
End: 2023-05-12
Payer: COMMERCIAL

## 2023-05-12 VITALS
BODY MASS INDEX: 31.99 KG/M2 | HEIGHT: 65 IN | TEMPERATURE: 98.4 F | WEIGHT: 192 LBS | HEART RATE: 81 BPM | OXYGEN SATURATION: 99 % | SYSTOLIC BLOOD PRESSURE: 113 MMHG | RESPIRATION RATE: 20 BRPM | DIASTOLIC BLOOD PRESSURE: 89 MMHG

## 2023-05-12 DIAGNOSIS — R07.89 ATYPICAL CHEST PAIN: Primary | ICD-10-CM

## 2023-05-12 LAB
ALBUMIN SERPL-MCNC: 4.3 G/DL (ref 3.5–5)
ALBUMIN/GLOB SERPL: 1.2 (ref 0.4–1.6)
ALP SERPL-CCNC: 52 U/L (ref 45–117)
ALT SERPL-CCNC: 13 U/L (ref 13–61)
ANION GAP SERPL CALC-SCNC: 13 MMOL/L (ref 2–11)
APPEARANCE UR: CLEAR
AST SERPL-CCNC: 24 U/L (ref 15–37)
BACTERIA URNS QL MICRO: NORMAL /HPF
BILIRUB SERPL-MCNC: 0.5 MG/DL (ref 0.2–1.1)
BILIRUB UR QL: NEGATIVE
BUN SERPL-MCNC: 11 MG/DL (ref 7–18)
CALCIUM SERPL-MCNC: 9.2 MG/DL (ref 8.3–10.4)
CASTS URNS QL MICRO: 0 /LPF
CHLORIDE SERPL-SCNC: 105 MMOL/L (ref 98–107)
CO2 SERPL-SCNC: 20 MMOL/L (ref 21–32)
COLOR UR: YELLOW
CREAT SERPL-MCNC: 0.65 MG/DL (ref 0.6–1)
CRYSTALS URNS QL MICRO: 0 /LPF
D DIMER PPP FEU-MCNC: 0.31 UG/ML(FEU)
EPI CELLS #/AREA URNS HPF: NORMAL /HPF
ERYTHROCYTE [DISTWIDTH] IN BLOOD BY AUTOMATED COUNT: 12 % (ref 11.9–14.6)
GLOBULIN SER CALC-MCNC: 3.5 G/DL (ref 2.8–4.5)
GLUCOSE SERPL-MCNC: 85 MG/DL (ref 65–100)
GLUCOSE UR STRIP.AUTO-MCNC: NEGATIVE MG/DL
HCG UR QL: NEGATIVE
HCT VFR BLD AUTO: 35.1 % (ref 35.8–46.3)
HGB BLD-MCNC: 11.7 G/DL (ref 11.7–15.4)
HGB UR QL STRIP: ABNORMAL
KETONES UR QL STRIP.AUTO: NEGATIVE MG/DL
LEUKOCYTE ESTERASE UR QL STRIP.AUTO: NEGATIVE
MCH RBC QN AUTO: 28 PG (ref 26.1–32.9)
MCHC RBC AUTO-ENTMCNC: 33.3 G/DL (ref 31.4–35)
MCV RBC AUTO: 84 FL (ref 82–102)
MUCOUS THREADS URNS QL MICRO: 0 /LPF
NITRITE UR QL STRIP.AUTO: NEGATIVE
NRBC # BLD: 0 K/UL (ref 0–0.2)
OTHER OBSERVATIONS: NORMAL
PH UR STRIP: 6.5 (ref 5–9)
PLATELET # BLD AUTO: 308 K/UL (ref 150–450)
PMV BLD AUTO: 9.1 FL (ref 9.4–12.3)
POTASSIUM SERPL-SCNC: 3.7 MMOL/L (ref 3.5–5.1)
PROT SERPL-MCNC: 7.8 G/DL (ref 6.4–8.2)
PROT UR STRIP-MCNC: NEGATIVE MG/DL
RBC # BLD AUTO: 4.18 M/UL (ref 4.05–5.2)
RBC #/AREA URNS HPF: 0 /HPF
SODIUM SERPL-SCNC: 138 MMOL/L (ref 133–143)
SP GR UR REFRACTOMETRY: <=1.005 (ref 1–1.02)
TROPONIN T SERPL HS-MCNC: <6 NG/L (ref 0–14)
TROPONIN T SERPL HS-MCNC: <6 NG/L (ref 0–14)
UROBILINOGEN UR QL STRIP.AUTO: 0.2 EU/DL (ref 0.2–1)
WBC # BLD AUTO: 6.1 K/UL (ref 4.3–11.1)
WBC URNS QL MICRO: 0 /HPF

## 2023-05-12 PROCEDURE — 81025 URINE PREGNANCY TEST: CPT

## 2023-05-12 PROCEDURE — 94761 N-INVAS EAR/PLS OXIMETRY MLT: CPT | Performed by: PHYSICIAN ASSISTANT

## 2023-05-12 PROCEDURE — 81001 URINALYSIS AUTO W/SCOPE: CPT

## 2023-05-12 PROCEDURE — 85027 COMPLETE CBC AUTOMATED: CPT

## 2023-05-12 PROCEDURE — 2580000003 HC RX 258: Performed by: PHYSICIAN ASSISTANT

## 2023-05-12 PROCEDURE — 85379 FIBRIN DEGRADATION QUANT: CPT

## 2023-05-12 PROCEDURE — 99285 EMERGENCY DEPT VISIT HI MDM: CPT | Performed by: PHYSICIAN ASSISTANT

## 2023-05-12 PROCEDURE — 84484 ASSAY OF TROPONIN QUANT: CPT

## 2023-05-12 PROCEDURE — 96361 HYDRATE IV INFUSION ADD-ON: CPT | Performed by: PHYSICIAN ASSISTANT

## 2023-05-12 PROCEDURE — 80053 COMPREHEN METABOLIC PANEL: CPT

## 2023-05-12 PROCEDURE — 96360 HYDRATION IV INFUSION INIT: CPT | Performed by: PHYSICIAN ASSISTANT

## 2023-05-12 PROCEDURE — 71045 X-RAY EXAM CHEST 1 VIEW: CPT

## 2023-05-12 RX ORDER — 0.9 % SODIUM CHLORIDE 0.9 %
1000 INTRAVENOUS SOLUTION INTRAVENOUS
Status: COMPLETED | OUTPATIENT
Start: 2023-05-12 | End: 2023-05-12

## 2023-05-12 RX ADMIN — SODIUM CHLORIDE 1000 ML: 9 INJECTION, SOLUTION INTRAVENOUS at 16:12

## 2023-05-12 ASSESSMENT — LIFESTYLE VARIABLES
HOW MANY STANDARD DRINKS CONTAINING ALCOHOL DO YOU HAVE ON A TYPICAL DAY: 1 OR 2
HOW OFTEN DO YOU HAVE A DRINK CONTAINING ALCOHOL: MONTHLY OR LESS

## 2023-05-12 ASSESSMENT — PAIN SCALES - GENERAL: PAINLEVEL_OUTOF10: 5

## 2023-05-12 ASSESSMENT — PAIN DESCRIPTION - LOCATION: LOCATION: HEAD

## 2023-05-12 NOTE — DISCHARGE INSTRUCTIONS
I recommend discussing continued use of Adipex with your PCP as this may be the cause of your symptoms today. Follow-up with your PCP in 1 to 2 days. Return to the ER for any new or worsening symptoms.

## 2023-05-12 NOTE — ED NOTES
I have reviewed discharge instructions with the patient. The patient verbalized understanding. Patient left ED via Discharge Method: ambulatory to Home with self    Opportunity for questions and clarification provided. Patient given 0 scripts. To continue your aftercare when you leave the hospital, you may receive an automated call from our care team to check in on how you are doing. This is a free service and part of our promise to provide the best care and service to meet your aftercare needs.  If you have questions, or wish to unsubscribe from this service please call [phone]. Thank you for Choosing our [facility] Emergency Department.        Cassie Lobato RN  05/12/23 4063

## 2023-05-12 NOTE — ED PROVIDER NOTES
Emergency Department Provider Note       PCP: Beau Garcia, APRN - JOSE A   Age: 28 y.o. Sex: female     DISPOSITION Discharge - Pending Orders Complete 05/12/2023 05:06:13 PM       ICD-10-CM    1. Atypical chest pain  R07.89           Medical Decision Making     Complexity of Problems Addressed:  Complexity of Problem: 1 acute, uncomplicated illness or injury. Data Reviewed and Analyzed:  Category 1:   I independently ordered and reviewed each unique test.  I reviewed external records: provider visit note from PCP. Category 2:   I interpreted the X-rays. Agree with radiologist interpretation  EKG: Normal sinus rhythm 93 bpm.  Rightward axis deviation  No ST segment elevation or depression. Normal QTc. Category 3: Discussion of management or test interpretation. 26-year-old female presenting today for evaluation of chest pain that began 2 hours prior to arrival.  Recently started Adipex. Vital signs stable. Will check labs, D-dimer, chest x-ray. Work-up unremarkable, negative D-dimer and troponin. Suspect that the palpitations are likely due to patient's medication, discussed starting this medication versus trialing lower dose. She will discuss with her primary care provider who prescribed medication. She will return for any new or worsening symptoms  ED Course as of 05/12/23 1722   Fri May 12, 2023   1644 D-Dimer, Quant: 0.31 [KE]   1644 Troponin T: <6.0 [KE]      ED Course User Index  [KE] MALIA Armstrong       Risk of Complications and/or Morbidity of Patient Management:  Prescription drug management performed and Shared medical decision making was utilized in creating the patients health plan today. History     Alyssia Vega is a 28 y.o. female who presents to the Emergency Department with chief complaint of    Chief Complaint   Patient presents with    Chest Pain      29 yo female presenting to emergency department today for evaluation of chest pain.   Symptoms began

## 2023-08-24 ENCOUNTER — TELEPHONE (OUTPATIENT)
Dept: OBGYN CLINIC | Age: 28
End: 2023-08-24

## 2023-08-24 NOTE — TELEPHONE ENCOUNTER
Pt called requesting a letter for work. Pt has not been seen by our office. Pt is scheduled for Pregnancy Confirmation on 9/6/23 with . Pt informed until she is a established pt we are unable to provide a note for work. Pt encouraged to contact PCP. Pt agree's and voiced understanding.

## 2023-09-06 ENCOUNTER — OFFICE VISIT (OUTPATIENT)
Dept: OBGYN CLINIC | Age: 28
End: 2023-09-06
Payer: COMMERCIAL

## 2023-09-06 VITALS
WEIGHT: 189 LBS | HEIGHT: 65 IN | BODY MASS INDEX: 31.49 KG/M2 | DIASTOLIC BLOOD PRESSURE: 78 MMHG | SYSTOLIC BLOOD PRESSURE: 124 MMHG

## 2023-09-06 DIAGNOSIS — R30.0 DYSURIA: ICD-10-CM

## 2023-09-06 DIAGNOSIS — N92.6 MISSED MENSES: Primary | ICD-10-CM

## 2023-09-06 PROBLEM — Z30.09 BIRTH CONTROL COUNSELING: Status: RESOLVED | Noted: 2021-04-14 | Resolved: 2023-09-06

## 2023-09-06 PROCEDURE — 99214 OFFICE O/P EST MOD 30 MIN: CPT | Performed by: OBSTETRICS & GYNECOLOGY

## 2023-09-06 PROCEDURE — 76830 TRANSVAGINAL US NON-OB: CPT | Performed by: OBSTETRICS & GYNECOLOGY

## 2023-09-06 RX ORDER — PYRIDOXINE HCL (VITAMIN B6) 50 MG
50 TABLET ORAL 3 TIMES DAILY
Qty: 120 TABLET | Refills: 3 | Status: SHIPPED | OUTPATIENT
Start: 2023-09-06 | End: 2024-09-05

## 2023-09-06 RX ORDER — DIPHENHYDRAMINE HCL 50 MG
50 CAPSULE ORAL EVERY EVENING
Qty: 30 CAPSULE | Refills: 1 | Status: SHIPPED | OUTPATIENT
Start: 2023-09-06

## 2023-09-06 SDOH — ECONOMIC STABILITY: HOUSING INSECURITY
IN THE LAST 12 MONTHS, WAS THERE A TIME WHEN YOU DID NOT HAVE A STEADY PLACE TO SLEEP OR SLEPT IN A SHELTER (INCLUDING NOW)?: PATIENT REFUSED

## 2023-09-06 SDOH — ECONOMIC STABILITY: INCOME INSECURITY: HOW HARD IS IT FOR YOU TO PAY FOR THE VERY BASICS LIKE FOOD, HOUSING, MEDICAL CARE, AND HEATING?: PATIENT DECLINED

## 2023-09-06 SDOH — ECONOMIC STABILITY: FOOD INSECURITY: WITHIN THE PAST 12 MONTHS, YOU WORRIED THAT YOUR FOOD WOULD RUN OUT BEFORE YOU GOT MONEY TO BUY MORE.: PATIENT DECLINED

## 2023-09-06 SDOH — ECONOMIC STABILITY: FOOD INSECURITY: WITHIN THE PAST 12 MONTHS, THE FOOD YOU BOUGHT JUST DIDN'T LAST AND YOU DIDN'T HAVE MONEY TO GET MORE.: PATIENT DECLINED

## 2023-09-06 NOTE — PROGRESS NOTES
50% of the total time spent with the patient. On this date I have spent 30 minutes reviewing previous notes, test results, and face to face with the patient discussing the diagnosis and importance of compliance with the treatment plan as well as documenting. Assessment/Plan:    28 y.o.  at [address] by d=US today with  IUP:    1) Routine pregnancy:  -PN labs and NIPT at education visit. Ucx today due to sx.  Trace blood only on UA  -FU w/ RN in 1-2 wks for new pregnancy counseling   -RTO for New OB visti in 4wks          Estela Urbano DO

## 2023-09-09 LAB
BACTERIA SPEC CULT: NORMAL
BACTERIA SPEC CULT: NORMAL
SERVICE CMNT-IMP: NORMAL

## 2023-10-05 ENCOUNTER — TELEPHONE (OUTPATIENT)
Dept: OBGYN CLINIC | Age: 28
End: 2023-10-05

## 2023-10-05 NOTE — TELEPHONE ENCOUNTER
Pt was a no show for NOBEX on 10/4/23 with Antonia Hernandez. Pt was a no show for OB Education on 9/20/23. No show letter mailed to address on file. Mychart sent. Attempted to contact pt to reschedule. No answer, LM instructing pt to return call.

## 2023-10-05 NOTE — TELEPHONE ENCOUNTER
----- Message from Angélica Lozano RN sent at 10/4/2023  4:19 PM EDT -----  Regarding: OB NS  OB NS to NOB exam today with Dr Brian López. Both you and Alejo Butler tried to reach her regarding her missed new OB consult, with no success. Thanks!

## 2023-10-09 NOTE — TELEPHONE ENCOUNTER
2nd attempt to contact pt. No answer, LM instructing pt to return call. Per MONY- pt does not have Emergency Contact we can call.

## 2023-10-16 NOTE — TELEPHONE ENCOUNTER
3rd attempt to contact pt. No answer, unable to LM on VM. Letter mailed to address on file as certified.

## 2023-11-01 ENCOUNTER — TELEPHONE (OUTPATIENT)
Dept: OBGYN CLINIC | Age: 28
End: 2023-11-01

## 2023-11-01 NOTE — TELEPHONE ENCOUNTER
I called Destiny Orona back to let her know she indeed needed a pregnancy confirmation  I left her a voice mail to call me back to schedule and ask for me.

## 2023-11-01 NOTE — TELEPHONE ENCOUNTER
Korigrupo Praveena called and wanted to schedule an appointment. After questioning her, she stated she cancelled her last two appointments because she was not pregnant any more. Now she is calling and was seen by her job nurse and her UA pregnancy test was a faint positive. But her lab test was positive( then said her job nurse has not called her with these results) .      I scheduled her a pregnancy confirmation based on her saying this was a new pregnancy

## 2023-11-22 ENCOUNTER — TELEPHONE (OUTPATIENT)
Dept: OBGYN CLINIC | Age: 28
End: 2023-11-22

## 2023-11-22 NOTE — TELEPHONE ENCOUNTER
----- Message from SOHAIL Wong NP sent at 11/22/2023 10:23 AM EST -----  Regarding: New OB education appt  Can we please call the patient as she had a new ob education appt scheduled today and did not show for and have her rescheduled.      Thank you,  Lakeshia

## 2023-11-28 ENCOUNTER — TELEPHONE (OUTPATIENT)
Dept: OBGYN CLINIC | Age: 28
End: 2023-11-28

## 2024-05-14 ENCOUNTER — TELEPHONE (OUTPATIENT)
Dept: FAMILY MEDICINE CLINIC | Facility: CLINIC | Age: 29
End: 2024-05-14

## 2024-05-14 NOTE — TELEPHONE ENCOUNTER
Called pt to schedule new patient appointment, no answer left VM for pt to call back and schedule.

## 2024-05-14 NOTE — TELEPHONE ENCOUNTER
----- Message from Alma Matos MA sent at 5/14/2024  9:20 AM EDT -----  Regarding: FW: E[facility] Appointment Request    ----- Message -----  From: Anjel Pate  Sent: 5/13/2024   3:53 PM EDT  To: [facility] Clinical Staff  Subject: E[facility] Appointment Request                          E[facility] Appointment Request    Patient needs appointment for E[facility] Appointment Type: Existing Condition Follow Up.    Reason for Appointment Request: No appointments available during search.   Patient called for a visit for her birth control pill. Said she's open to any pcp, and preferred date of visit is any day after 3pm.  --------------------------------------------------------------------------------------------------------------------------    Relationship to Patient: Self     Call Back Information: OK to leave message on voicemail  Preferred Call Back Number: Phone [phone]

## 2024-05-29 ENCOUNTER — OFFICE VISIT (OUTPATIENT)
Dept: FAMILY MEDICINE CLINIC | Facility: CLINIC | Age: 29
End: 2024-05-29
Payer: COMMERCIAL

## 2024-05-29 VITALS
SYSTOLIC BLOOD PRESSURE: 110 MMHG | TEMPERATURE: 97.8 F | BODY MASS INDEX: 31.41 KG/M2 | DIASTOLIC BLOOD PRESSURE: 70 MMHG | RESPIRATION RATE: 16 BRPM | HEIGHT: 64 IN | OXYGEN SATURATION: 99 % | HEART RATE: 96 BPM | WEIGHT: 184 LBS

## 2024-05-29 DIAGNOSIS — N89.8 VAGINAL DISCHARGE: Primary | ICD-10-CM

## 2024-05-29 DIAGNOSIS — M79.622 ARMPIT PAIN, LEFT: ICD-10-CM

## 2024-05-29 DIAGNOSIS — Z76.89 ENCOUNTER TO ESTABLISH CARE: ICD-10-CM

## 2024-05-29 DIAGNOSIS — N89.8 VAGINAL DISCHARGE: ICD-10-CM

## 2024-05-29 PROBLEM — R30.0 DIFFICULT OR PAINFUL URINATION: Status: RESOLVED | Noted: 2021-03-09 | Resolved: 2024-05-29

## 2024-05-29 PROBLEM — O13.9 GESTATIONAL HYPERTENSION: Status: ACTIVE | Noted: 2017-06-27

## 2024-05-29 PROBLEM — B00.1 COLD SORE: Status: ACTIVE | Noted: 2022-03-28

## 2024-05-29 LAB
BILIRUBIN, URINE, POC: NEGATIVE
BLOOD URINE, POC: NORMAL
GLUCOSE URINE, POC: NEGATIVE
KETONES, URINE, POC: NEGATIVE
LEUKOCYTE ESTERASE, URINE, POC: NEGATIVE
NITRITE, URINE, POC: NEGATIVE
PH, URINE, POC: 7 (ref 4.6–8)
PROTEIN,URINE, POC: NEGATIVE
SPECIFIC GRAVITY, URINE, POC: 1.02 (ref 1–1.03)
URINALYSIS CLARITY, POC: CLEAR
URINALYSIS COLOR, POC: YELLOW
UROBILINOGEN, POC: NORMAL

## 2024-05-29 PROCEDURE — 99204 OFFICE O/P NEW MOD 45 MIN: CPT | Performed by: NURSE PRACTITIONER

## 2024-05-29 PROCEDURE — 81003 URINALYSIS AUTO W/O SCOPE: CPT | Performed by: NURSE PRACTITIONER

## 2024-05-29 RX ORDER — CEPHALEXIN 500 MG/1
500 CAPSULE ORAL 2 TIMES DAILY
Qty: 20 CAPSULE | Refills: 0 | Status: SHIPPED | OUTPATIENT
Start: 2024-05-29 | End: 2024-06-08

## 2024-05-29 RX ORDER — PHENTERMINE HYDROCHLORIDE 37.5 MG/1
37.5 TABLET ORAL
COMMUNITY

## 2024-05-29 SDOH — ECONOMIC STABILITY: FOOD INSECURITY: WITHIN THE PAST 12 MONTHS, THE FOOD YOU BOUGHT JUST DIDN'T LAST AND YOU DIDN'T HAVE MONEY TO GET MORE.: NEVER TRUE

## 2024-05-29 SDOH — ECONOMIC STABILITY: HOUSING INSECURITY
IN THE LAST 12 MONTHS, WAS THERE A TIME WHEN YOU DID NOT HAVE A STEADY PLACE TO SLEEP OR SLEPT IN A SHELTER (INCLUDING NOW)?: NO

## 2024-05-29 SDOH — ECONOMIC STABILITY: FOOD INSECURITY: WITHIN THE PAST 12 MONTHS, YOU WORRIED THAT YOUR FOOD WOULD RUN OUT BEFORE YOU GOT MONEY TO BUY MORE.: NEVER TRUE

## 2024-05-29 SDOH — ECONOMIC STABILITY: INCOME INSECURITY: HOW HARD IS IT FOR YOU TO PAY FOR THE VERY BASICS LIKE FOOD, HOUSING, MEDICAL CARE, AND HEATING?: NOT HARD AT ALL

## 2024-05-29 ASSESSMENT — PATIENT HEALTH QUESTIONNAIRE - PHQ9
SUM OF ALL RESPONSES TO PHQ9 QUESTIONS 1 & 2: 0
1. LITTLE INTEREST OR PLEASURE IN DOING THINGS: NOT AT ALL
SUM OF ALL RESPONSES TO PHQ QUESTIONS 1-9: 0
SUM OF ALL RESPONSES TO PHQ QUESTIONS 1-9: 0
2. FEELING DOWN, DEPRESSED OR HOPELESS: NOT AT ALL
SUM OF ALL RESPONSES TO PHQ QUESTIONS 1-9: 0
SUM OF ALL RESPONSES TO PHQ QUESTIONS 1-9: 0

## 2024-05-29 NOTE — PROGRESS NOTES
Turner Curiel (: 1995) presents today to establish care and for other conditions. She has history of hypertension during pregnancy, abnormal pap smear.    Has a lump left side next to arm pit. Thinks it is a boil- has been like this for awhile- if she puts soap on area it burns. Pruritic at times. Hx of boils in the past. Was on antibiotics last month for UTI. Having a lot of urinary frequency and vaginal discharge- white-no odor. Urge to urinate has not changed and discharge is just a little dab.     Hx of abnormal pap smears- sees gynecology. LMP 2024.     Currently on birth control pills- would like to get IUD if possible. Has upcoming gynecology apt.     She is on adipex- getting from [employer]- has not lost much weight on it.     Chief Complaint   Patient presents with    New Patient     Establish care and possible UTI and would like you to look at a lump left side next to arm pit      Patient Active Problem List   Diagnosis    Pap smear abnormality of cervix/human papillomavirus (HPV) positive    Obesity    LGSIL on Pap smear of cervix    Acne vulgaris    Gestational hypertension    Cold sore     Past Medical History:   Diagnosis Date    Abnormal Papanicolaou smear of cervix     Contact dermatitis and eczema due to cause     Gestational hypertension 2017    Last Assessment & Plan:  BP normal to mild No preeclampsia symptoms.      Gestational hypertension      Past Surgical History:   Procedure Laterality Date    GASTRIC BYPASS,OBESE<150CM CLAUDY-EN-Y       Family History   Problem Relation Age of Onset    Ovarian Cancer Neg Hx     Colon Cancer Neg Hx     Breast Cancer Neg Hx     Cancer Maternal Grandmother     Uterine Cancer Neg Hx     Hypertension Mother      Social History     Socioeconomic History    Marital status: Single     Spouse name: None    Number of children: None    Years of education: None    Highest

## 2024-06-01 LAB
BACTERIA SPEC CULT: NORMAL
SERVICE CMNT-IMP: NORMAL

## 2024-07-15 ENCOUNTER — TELEPHONE (OUTPATIENT)
Dept: FAMILY MEDICINE CLINIC | Facility: CLINIC | Age: 29
End: 2024-07-15

## 2024-07-18 ENCOUNTER — OFFICE VISIT (OUTPATIENT)
Dept: FAMILY MEDICINE CLINIC | Facility: CLINIC | Age: 29
End: 2024-07-18
Payer: COMMERCIAL

## 2024-07-18 VITALS
HEART RATE: 102 BPM | TEMPERATURE: 97.7 F | DIASTOLIC BLOOD PRESSURE: 76 MMHG | RESPIRATION RATE: 18 BRPM | SYSTOLIC BLOOD PRESSURE: 124 MMHG | BODY MASS INDEX: 32.1 KG/M2 | WEIGHT: 188 LBS | OXYGEN SATURATION: 99 % | HEIGHT: 64 IN

## 2024-07-18 DIAGNOSIS — N30.00 ACUTE CYSTITIS WITHOUT HEMATURIA: ICD-10-CM

## 2024-07-18 DIAGNOSIS — N30.00 ACUTE CYSTITIS WITHOUT HEMATURIA: Primary | ICD-10-CM

## 2024-07-18 DIAGNOSIS — B37.31 YEAST VAGINITIS: ICD-10-CM

## 2024-07-18 PROCEDURE — 81000 URINALYSIS NONAUTO W/SCOPE: CPT | Performed by: NURSE PRACTITIONER

## 2024-07-18 PROCEDURE — 99214 OFFICE O/P EST MOD 30 MIN: CPT | Performed by: NURSE PRACTITIONER

## 2024-07-18 RX ORDER — PHENAZOPYRIDINE HYDROCHLORIDE 200 MG/1
200 TABLET, FILM COATED ORAL 3 TIMES DAILY PRN
Qty: 15 TABLET | Refills: 0 | Status: SHIPPED | OUTPATIENT
Start: 2024-07-18 | End: 2024-07-23

## 2024-07-18 RX ORDER — NITROFURANTOIN 25; 75 MG/1; MG/1
CAPSULE ORAL
COMMUNITY
Start: 2024-06-28 | End: 2024-07-18 | Stop reason: ALTCHOICE

## 2024-07-18 RX ORDER — FLUCONAZOLE 150 MG/1
150 TABLET ORAL WEEKLY
Qty: 2 TABLET | Refills: 0 | Status: SHIPPED | OUTPATIENT
Start: 2024-07-18

## 2024-07-18 RX ORDER — CEPHALEXIN 500 MG/1
500 CAPSULE ORAL 3 TIMES DAILY
Qty: 15 CAPSULE | Refills: 0 | Status: SHIPPED | OUTPATIENT
Start: 2024-07-18

## 2024-07-18 ASSESSMENT — PATIENT HEALTH QUESTIONNAIRE - PHQ9
1. LITTLE INTEREST OR PLEASURE IN DOING THINGS: NOT AT ALL
SUM OF ALL RESPONSES TO PHQ9 QUESTIONS 1 & 2: 0
SUM OF ALL RESPONSES TO PHQ QUESTIONS 1-9: 0
SUM OF ALL RESPONSES TO PHQ QUESTIONS 1-9: 0
2. FEELING DOWN, DEPRESSED OR HOPELESS: NOT AT ALL
SUM OF ALL RESPONSES TO PHQ QUESTIONS 1-9: 0
SUM OF ALL RESPONSES TO PHQ QUESTIONS 1-9: 0

## 2024-07-18 NOTE — PROGRESS NOTES
Respiratory:  Negative for cough, chest tightness, shortness of breath and wheezing.    Cardiovascular:  Negative for chest pain, palpitations and leg swelling.   Gastrointestinal:  Positive for nausea. Negative for abdominal pain, blood in stool, constipation, diarrhea and vomiting.   Genitourinary:  Positive for difficulty urinating, dysuria, flank pain, frequency, urgency and vaginal discharge. Negative for hematuria.   Musculoskeletal:  Negative for arthralgias, gait problem and myalgias.   Skin:  Negative for rash.   Neurological:  Negative for dizziness, tremors, seizures and headaches.   Psychiatric/Behavioral:  Negative for decreased concentration and sleep disturbance. The patient is not nervous/anxious.       /76   Pulse (!) 102   Temp 97.7 °F (36.5 °C) (Temporal)   Resp 18   Ht 1.626 m (5' 4\")   Wt 85.3 kg (188 lb)   LMP 07/04/2024   SpO2 99%   BMI 32.27 kg/m²       Objective   Physical Exam  Constitutional:       Appearance: Normal appearance.   HENT:      Head: Normocephalic and atraumatic.      Right Ear: Tympanic membrane, ear canal and external ear normal.      Left Ear: Tympanic membrane, ear canal and external ear normal.      Nose: Nose normal.      Mouth/Throat:      Mouth: Mucous membranes are moist.   Eyes:      Extraocular Movements: Extraocular movements intact.      Conjunctiva/sclera: Conjunctivae normal.      Pupils: Pupils are equal, round, and reactive to light.   Cardiovascular:      Rate and Rhythm: Normal rate and regular rhythm.      Pulses: Normal pulses.      Heart sounds: Normal heart sounds.   Pulmonary:      Effort: Pulmonary effort is normal.      Breath sounds: Normal breath sounds.   Abdominal:      General: Bowel sounds are normal.      Tenderness: There is no abdominal tenderness. There is no right CVA tenderness or left CVA tenderness.   Musculoskeletal:         General: Normal range of motion.      Cervical back: Normal range of motion and neck supple.

## 2024-07-19 LAB
BACTERIA URINE, POC: ABNORMAL
BILIRUBIN, URINE, POC: NEGATIVE
BLOOD URINE, POC: ABNORMAL
CASTS URINE, POC: 0
EPI CELLS URINE, POC: ABNORMAL
GLUCOSE URINE, POC: NEGATIVE
KETONES, URINE, POC: NEGATIVE
LEUKOCYTE ESTERASE, URINE, POC: NEGATIVE
NITRITE, URINE, POC: NEGATIVE
PH, URINE, POC: 8.5 (ref 4.6–8)
PROTEIN,URINE, POC: NEGATIVE
RBC, URINE, POC: ABNORMAL
SPECIFIC GRAVITY, URINE, POC: 1.02 (ref 1–1.03)
TRICHOMONAS URINE, POC: NEGATIVE
URINALYSIS CLARITY, POC: CLEAR
URINALYSIS COLOR, POC: ABNORMAL
UROBILINOGEN, POC: NORMAL
WBC, URINE, POC: ABNORMAL
YEAST, URINE, POC: ABNORMAL

## 2024-07-19 ASSESSMENT — ENCOUNTER SYMPTOMS
EYE PAIN: 0
CONSTIPATION: 0
VOMITING: 0
RHINORRHEA: 0
NAUSEA: 1
DIARRHEA: 0
CHEST TIGHTNESS: 0
SHORTNESS OF BREATH: 0
ABDOMINAL PAIN: 0
WHEEZING: 0
BLOOD IN STOOL: 0
EYE DISCHARGE: 0
COUGH: 0

## 2024-07-20 LAB
BACTERIA SPEC CULT: NORMAL
BACTERIA SPEC CULT: NORMAL
SERVICE CMNT-IMP: NORMAL

## 2024-07-23 ENCOUNTER — TELEPHONE (OUTPATIENT)
Dept: FAMILY MEDICINE CLINIC | Facility: CLINIC | Age: 29
End: 2024-07-23

## 2024-07-23 DIAGNOSIS — N30.00 ACUTE CYSTITIS WITHOUT HEMATURIA: ICD-10-CM

## 2024-07-23 RX ORDER — CEPHALEXIN 500 MG/1
500 CAPSULE ORAL 3 TIMES DAILY
Qty: 5 CAPSULE | Refills: 0 | Status: SHIPPED | OUTPATIENT
Start: 2024-07-23 | End: 2024-08-12 | Stop reason: ALTCHOICE

## 2024-08-12 ENCOUNTER — OFFICE VISIT (OUTPATIENT)
Dept: FAMILY MEDICINE CLINIC | Facility: CLINIC | Age: 29
End: 2024-08-12
Payer: COMMERCIAL

## 2024-08-12 VITALS
BODY MASS INDEX: 31.76 KG/M2 | OXYGEN SATURATION: 99 % | DIASTOLIC BLOOD PRESSURE: 70 MMHG | TEMPERATURE: 97.1 F | HEIGHT: 64 IN | SYSTOLIC BLOOD PRESSURE: 110 MMHG | HEART RATE: 91 BPM | WEIGHT: 186 LBS | RESPIRATION RATE: 16 BRPM

## 2024-08-12 DIAGNOSIS — B37.9 ANTIBIOTIC-INDUCED YEAST INFECTION: ICD-10-CM

## 2024-08-12 DIAGNOSIS — R39.9 UTI SYMPTOMS: ICD-10-CM

## 2024-08-12 DIAGNOSIS — N89.8 VAGINAL DISCHARGE: ICD-10-CM

## 2024-08-12 DIAGNOSIS — R39.9 UTI SYMPTOMS: Primary | ICD-10-CM

## 2024-08-12 DIAGNOSIS — T36.95XA ANTIBIOTIC-INDUCED YEAST INFECTION: ICD-10-CM

## 2024-08-12 DIAGNOSIS — N39.0 RECURRENT UTI: ICD-10-CM

## 2024-08-12 LAB
BILIRUBIN, URINE, POC: NEGATIVE
BLOOD URINE, POC: ABNORMAL
GLUCOSE URINE, POC: NEGATIVE
KETONES, URINE, POC: NEGATIVE
LEUKOCYTE ESTERASE, URINE, POC: NEGATIVE
NITRITE, URINE, POC: NEGATIVE
PH, URINE, POC: 6.5 (ref 4.6–8)
PROTEIN,URINE, POC: NEGATIVE
SPECIFIC GRAVITY, URINE, POC: 1.02 (ref 1–1.03)
URINALYSIS CLARITY, POC: ABNORMAL
URINALYSIS COLOR, POC: ABNORMAL
UROBILINOGEN, POC: ABNORMAL

## 2024-08-12 PROCEDURE — 81003 URINALYSIS AUTO W/O SCOPE: CPT | Performed by: NURSE PRACTITIONER

## 2024-08-12 PROCEDURE — 99213 OFFICE O/P EST LOW 20 MIN: CPT | Performed by: NURSE PRACTITIONER

## 2024-08-12 RX ORDER — NITROFURANTOIN 25; 75 MG/1; MG/1
100 CAPSULE ORAL 2 TIMES DAILY
Qty: 14 CAPSULE | Refills: 0 | Status: SHIPPED | OUTPATIENT
Start: 2024-08-12 | End: 2024-08-19

## 2024-08-12 RX ORDER — FLUCONAZOLE 150 MG/1
150 TABLET ORAL
Qty: 2 TABLET | Refills: 0 | Status: SHIPPED | OUTPATIENT
Start: 2024-08-12 | End: 2024-08-18

## 2024-08-12 SDOH — ECONOMIC STABILITY: INCOME INSECURITY: HOW HARD IS IT FOR YOU TO PAY FOR THE VERY BASICS LIKE FOOD, HOUSING, MEDICAL CARE, AND HEATING?: NOT HARD AT ALL

## 2024-08-12 SDOH — ECONOMIC STABILITY: FOOD INSECURITY: WITHIN THE PAST 12 MONTHS, THE FOOD YOU BOUGHT JUST DIDN'T LAST AND YOU DIDN'T HAVE MONEY TO GET MORE.: NEVER TRUE

## 2024-08-12 SDOH — ECONOMIC STABILITY: FOOD INSECURITY: WITHIN THE PAST 12 MONTHS, YOU WORRIED THAT YOUR FOOD WOULD RUN OUT BEFORE YOU GOT MONEY TO BUY MORE.: NEVER TRUE

## 2024-08-12 ASSESSMENT — PATIENT HEALTH QUESTIONNAIRE - PHQ9
SUM OF ALL RESPONSES TO PHQ9 QUESTIONS 1 & 2: 0
SUM OF ALL RESPONSES TO PHQ QUESTIONS 1-9: 0
1. LITTLE INTEREST OR PLEASURE IN DOING THINGS: NOT AT ALL
SUM OF ALL RESPONSES TO PHQ QUESTIONS 1-9: 0

## 2024-08-12 ASSESSMENT — ENCOUNTER SYMPTOMS
SINUS PAIN: 0
COUGH: 0
SHORTNESS OF BREATH: 0
BACK PAIN: 0
DIARRHEA: 0
EYE REDNESS: 0
NAUSEA: 0
BLOOD IN STOOL: 0
EYE PAIN: 0
VOMITING: 0
SORE THROAT: 0

## 2024-08-12 NOTE — PROGRESS NOTES
Turner Curiel (:  1995) is a 28 y.o. female,Established patient, here for evaluation of the following chief complaint(s):  Urinary Frequency (Pt. C/o urine frequency with urge to urinate for a few days )         Assessment & Plan  UTI symptoms       Orders:    AMB POC URINALYSIS DIP STICK AUTO W/O MICRO    Culture, Urine; Future    nitrofurantoin, macrocrystal-monohydrate, (MACROBID) 100 MG capsule; Take 1 capsule by mouth 2 times daily for 7 days    Vaginal discharge       Orders:    NuSwab BV and Candida, FAREED; Future  not obtained today due to cost  Microscopic evaluation without hyphae or clue cells  Antibiotic-induced yeast infection       Orders:    fluconazole (DIFLUCAN) 150 MG tablet; Take 1 tablet by mouth every 72 hours for 6 days    Recurrent UTI   Uncontrolled, continue current medications and will refer to urology for further evaluation    Orders:    [facility], [place]      Return if symptoms worsen or fail to improve.       Subjective   Urinary Frequency   Associated symptoms include frequency and urgency. Pertinent negatives include no chills, nausea or vomiting.     Urinary Tract Infection: Patient complains of frequency and urgency.  She has had symptoms for a few days. Patient also complains of  urinary odor and small amount of vaginal discharge . Patient denies back pain and fever. Patient DOES have a history of recurrent UTI.  Prescribed Keflex 2024, Macrobid/Diflucan 2024 and Keflex again 2024.   culture contaminated.  Patient does not have a history of pyelonephritis.       Review of Systems   Constitutional:  Negative for chills and fever.   HENT:  Negative for congestion, ear pain, sinus pain and sore throat.    Eyes:  Negative for pain and redness.   Respiratory:  Negative for cough and shortness of breath.    Cardiovascular:  Negative for chest pain and palpitations.   Gastrointestinal:  Negative for blood in stool, constipation, diarrhea,

## 2024-08-14 LAB
BACTERIA SPEC CULT: NORMAL
BACTERIA SPEC CULT: NORMAL
SERVICE CMNT-IMP: NORMAL

## 2024-08-30 DIAGNOSIS — R39.9 UTI SYMPTOMS: Primary | ICD-10-CM

## 2024-09-16 ENCOUNTER — OFFICE VISIT (OUTPATIENT)
Dept: UROLOGY | Age: 29
End: 2024-09-16
Payer: COMMERCIAL

## 2024-09-16 DIAGNOSIS — N39.0 URINARY TRACT INFECTION WITHOUT HEMATURIA, SITE UNSPECIFIED: Primary | ICD-10-CM

## 2024-09-16 LAB
BILIRUBIN, URINE, POC: NEGATIVE
BLOOD URINE, POC: NORMAL
GLUCOSE URINE, POC: NEGATIVE
KETONES, URINE, POC: NEGATIVE
LEUKOCYTE ESTERASE, URINE, POC: NEGATIVE
NITRITE, URINE, POC: NEGATIVE
PH, URINE, POC: 7 (ref 4.6–8)
PROTEIN,URINE, POC: NEGATIVE
SPECIFIC GRAVITY, URINE, POC: 1.02 (ref 1–1.03)
URINALYSIS CLARITY, POC: NORMAL
URINALYSIS COLOR, POC: NORMAL
UROBILINOGEN, POC: NORMAL

## 2024-09-16 PROCEDURE — 81003 URINALYSIS AUTO W/O SCOPE: CPT | Performed by: NURSE PRACTITIONER

## 2024-09-16 PROCEDURE — 99204 OFFICE O/P NEW MOD 45 MIN: CPT | Performed by: NURSE PRACTITIONER

## 2024-09-16 RX ORDER — MIRABEGRON 50 MG/1
50 TABLET, EXTENDED RELEASE ORAL DAILY
Qty: 90 TABLET | Refills: 3 | Status: SHIPPED | OUTPATIENT
Start: 2024-09-16

## 2024-09-16 RX ORDER — NITROFURANTOIN 25; 75 MG/1; MG/1
100 CAPSULE ORAL DAILY
Qty: 90 CAPSULE | Refills: 3 | Status: SHIPPED | OUTPATIENT
Start: 2024-09-16

## 2024-09-16 ASSESSMENT — ENCOUNTER SYMPTOMS
BACK PAIN: 0
NAUSEA: 0

## 2024-11-04 ENCOUNTER — OFFICE VISIT (OUTPATIENT)
Dept: FAMILY MEDICINE CLINIC | Facility: CLINIC | Age: 29
End: 2024-11-04

## 2024-11-04 VITALS
HEIGHT: 64 IN | TEMPERATURE: 98.1 F | SYSTOLIC BLOOD PRESSURE: 120 MMHG | BODY MASS INDEX: 32.44 KG/M2 | DIASTOLIC BLOOD PRESSURE: 80 MMHG | HEART RATE: 95 BPM | WEIGHT: 190 LBS | RESPIRATION RATE: 16 BRPM | OXYGEN SATURATION: 99 %

## 2024-11-04 DIAGNOSIS — N89.8 VAGINAL ODOR: ICD-10-CM

## 2024-11-04 DIAGNOSIS — B96.89 BV (BACTERIAL VAGINOSIS): Primary | ICD-10-CM

## 2024-11-04 DIAGNOSIS — N76.0 BV (BACTERIAL VAGINOSIS): Primary | ICD-10-CM

## 2024-11-04 LAB
BILIRUBIN, URINE, POC: ABNORMAL
BILIRUBIN, URINE, POC: NEGATIVE
BLOOD URINE, POC: ABNORMAL
BLOOD URINE, POC: ABNORMAL
GLUCOSE URINE, POC: ABNORMAL
GLUCOSE URINE, POC: NEGATIVE
KETONES, URINE, POC: ABNORMAL
KETONES, URINE, POC: NEGATIVE
LEUKOCYTE ESTERASE, URINE, POC: ABNORMAL
LEUKOCYTE ESTERASE, URINE, POC: NEGATIVE
NITRITE, URINE, POC: ABNORMAL
NITRITE, URINE, POC: NEGATIVE
PH, URINE, POC: 6 (ref 4.6–8)
PH, URINE, POC: ABNORMAL (ref 4.6–8)
PROTEIN,URINE, POC: ABNORMAL
PROTEIN,URINE, POC: NEGATIVE
SPECIFIC GRAVITY, URINE, POC: 1.01 (ref 1–1.03)
SPECIFIC GRAVITY, URINE, POC: ABNORMAL (ref 1–1.03)
URINALYSIS CLARITY, POC: ABNORMAL
URINALYSIS CLARITY, POC: ABNORMAL
URINALYSIS COLOR, POC: ABNORMAL
URINALYSIS COLOR, POC: YELLOW
UROBILINOGEN, POC: ABNORMAL
UROBILINOGEN, POC: NORMAL

## 2024-11-04 RX ORDER — METRONIDAZOLE 500 MG/1
500 TABLET ORAL 2 TIMES DAILY
Qty: 14 TABLET | Refills: 0 | Status: SHIPPED | OUTPATIENT
Start: 2024-11-04 | End: 2024-11-11

## 2024-11-04 RX ORDER — MEDROXYPROGESTERONE ACETATE 150 MG/ML
150 INJECTION, SUSPENSION INTRAMUSCULAR
COMMUNITY
Start: 2024-09-18

## 2024-11-04 SDOH — ECONOMIC STABILITY: FOOD INSECURITY: WITHIN THE PAST 12 MONTHS, YOU WORRIED THAT YOUR FOOD WOULD RUN OUT BEFORE YOU GOT MONEY TO BUY MORE.: NEVER TRUE

## 2024-11-04 SDOH — ECONOMIC STABILITY: FOOD INSECURITY: WITHIN THE PAST 12 MONTHS, THE FOOD YOU BOUGHT JUST DIDN'T LAST AND YOU DIDN'T HAVE MONEY TO GET MORE.: NEVER TRUE

## 2024-11-04 SDOH — ECONOMIC STABILITY: INCOME INSECURITY: HOW HARD IS IT FOR YOU TO PAY FOR THE VERY BASICS LIKE FOOD, HOUSING, MEDICAL CARE, AND HEATING?: NOT HARD AT ALL

## 2024-11-04 ASSESSMENT — PATIENT HEALTH QUESTIONNAIRE - PHQ9
SUM OF ALL RESPONSES TO PHQ QUESTIONS 1-9: 0
2. FEELING DOWN, DEPRESSED OR HOPELESS: NOT AT ALL
SUM OF ALL RESPONSES TO PHQ QUESTIONS 1-9: 0
SUM OF ALL RESPONSES TO PHQ9 QUESTIONS 1 & 2: 0
1. LITTLE INTEREST OR PLEASURE IN DOING THINGS: NOT AT ALL
SUM OF ALL RESPONSES TO PHQ QUESTIONS 1-9: 0
SUM OF ALL RESPONSES TO PHQ QUESTIONS 1-9: 0

## 2024-11-04 NOTE — PROGRESS NOTES
Turner Curiel (: 1995) presents today for vaginal odor and frequent urination. Symptoms started a week ago. Thinks she has BV- has had this in the past. Some dysuria with urination. No hematuria. No abdominal pain. Some left sided lower back pain. On depo shot- last period 2 months ago. No new sexual partners.     Chief Complaint   Patient presents with    Other     Odor from vagina and frequent urination pt thinks it may be BV x 1 week      Patient Active Problem List   Diagnosis    Pap smear abnormality of cervix/human papillomavirus (HPV) positive    Obesity    LGSIL on Pap smear of cervix    Acne vulgaris    Gestational hypertension    Cold sore     Past Medical History:   Diagnosis Date    Abnormal Papanicolaou smear of cervix     Contact dermatitis and eczema due to cause     Gestational hypertension 2017    Last Assessment & Plan:  BP normal to mild No preeclampsia symptoms.      Gestational hypertension      Past Surgical History:   Procedure Laterality Date    GASTRIC BYPASS,OBESE<150CM CLAUDY-EN-Y       Family History   Problem Relation Age of Onset    Ovarian Cancer Neg Hx     Colon Cancer Neg Hx     Breast Cancer Neg Hx     Cancer Maternal Grandmother     Uterine Cancer Neg Hx     Hypertension Mother      Social History     Socioeconomic History    Marital status: Single     Spouse name: None    Number of children: None    Years of education: None    Highest education level: None   Tobacco Use    Smoking status: Never    Smokeless tobacco: Never   Vaping Use    Vaping status: Never Used   Substance and Sexual Activity    Alcohol use: No    Drug use: No    Sexual activity: Yes     Partners: Male     Birth control/protection: None   Social History Narrative    Abuse: Feels safe at home, no history of physical abuse, no history of sexual abuse       Social Determinants of Health     Financial Resource Strain: Low

## 2024-11-06 LAB
BACTERIA SPEC CULT: NORMAL
SERVICE CMNT-IMP: NORMAL

## 2024-11-26 NOTE — LACTATION NOTE

## 2024-12-10 ENCOUNTER — TELEPHONE (OUTPATIENT)
Dept: UROLOGY | Age: 29
End: 2024-12-10

## 2024-12-10 ENCOUNTER — OFFICE VISIT (OUTPATIENT)
Dept: UROLOGY | Age: 29
End: 2024-12-10
Payer: COMMERCIAL

## 2024-12-10 DIAGNOSIS — N39.0 URINARY TRACT INFECTION WITHOUT HEMATURIA, SITE UNSPECIFIED: ICD-10-CM

## 2024-12-10 DIAGNOSIS — N39.0 URINARY TRACT INFECTION WITHOUT HEMATURIA, SITE UNSPECIFIED: Primary | ICD-10-CM

## 2024-12-10 DIAGNOSIS — Z76.89 ENCOUNTER TO ESTABLISH CARE WITH NEW DOCTOR: ICD-10-CM

## 2024-12-10 LAB
BILIRUBIN, URINE, POC: NEGATIVE
BLOOD URINE, POC: NORMAL
GLUCOSE URINE, POC: NEGATIVE MG/DL
KETONES, URINE, POC: NEGATIVE MG/DL
LEUKOCYTE ESTERASE, URINE, POC: NEGATIVE
NITRITE, URINE, POC: NEGATIVE
PH, URINE, POC: 7 (ref 4.6–8)
PROTEIN,URINE, POC: NEGATIVE MG/DL
SPECIFIC GRAVITY, URINE, POC: 1.01 (ref 1–1.03)
URINALYSIS CLARITY, POC: NORMAL
URINALYSIS COLOR, POC: NORMAL
UROBILINOGEN, POC: NORMAL MG/DL

## 2024-12-10 PROCEDURE — 99214 OFFICE O/P EST MOD 30 MIN: CPT | Performed by: NURSE PRACTITIONER

## 2024-12-10 PROCEDURE — 81003 URINALYSIS AUTO W/O SCOPE: CPT | Performed by: NURSE PRACTITIONER

## 2024-12-10 RX ORDER — METRONIDAZOLE 500 MG/1
500 TABLET ORAL 2 TIMES DAILY
Qty: 14 TABLET | Refills: 0 | Status: SHIPPED | OUTPATIENT
Start: 2024-12-10 | End: 2024-12-17

## 2024-12-10 ASSESSMENT — ENCOUNTER SYMPTOMS
NAUSEA: 0
BACK PAIN: 0

## 2024-12-10 NOTE — TELEPHONE ENCOUNTER
Let pt know it may be a few weeks before GYN can see her. I have sent in Flagyl for possible BV.

## 2024-12-10 NOTE — PROGRESS NOTES
Gynecology     Number of Visits Requested:   1    AMB POC URINALYSIS DIP STICK AUTO W/O MICRO    metroNIDAZOLE (FLAGYL) 500 MG tablet     Sig: Take 1 tablet by mouth 2 times daily for 7 days     Dispense:  14 tablet     Refill:  0       No follow-ups on file.    Total billable time associated with this visit was 30  minutes, during which I coordinated care, counseled the patient and/or family, documented information in the electronic medical record, independently interpreted results, obtained history and performed a medically appropriate examination, ordered medications/tests, reviewed tests and communicated with other health care professionals regarding this patient.     Sangita Chicas, DEONTE    [facility]   [address] [place] [address]  Phone: [phone]  Fax: [phone]     Dr. Abbott was supervising physician today and approves plan of care.    Elements of this note have been dictated using speech recognition software.  Although reviewed, errors of speech recognition may have occurred.

## 2024-12-11 DIAGNOSIS — Z76.89 ENCOUNTER TO ESTABLISH CARE WITH NEW DOCTOR: Primary | ICD-10-CM

## 2024-12-12 LAB
BACTERIA SPEC CULT: NORMAL
SERVICE CMNT-IMP: NORMAL

## 2025-01-06 NOTE — PROGRESS NOTES
COMFORT Curiel is a 29 y.o. female seen for annual GYN exam.    Past Medical History, Past Surgical History, Family history, Social History, and Medications were all reviewed with the patient today and updated as necessary.     Current Outpatient Medications   Medication Sig    medroxyPROGESTERone (DEPO-PROVERA) 150 MG/ML injection Inject 1 mL into the muscle    tretinoin (RETIN-A) 0.025 % cream Apply 1 Application topically     No current facility-administered medications for this visit.     Allergies   Allergen Reactions    Sulfamethoxazole-Trimethoprim Rash     Past Medical History:   Diagnosis Date    Abnormal Papanicolaou smear of cervix     Contact dermatitis and eczema due to cause     Gestational hypertension 2017    Last Assessment & Plan:  BP normal to mild No preeclampsia symptoms.      Gestational hypertension      Past Surgical History:   Procedure Laterality Date    GASTRIC BYPASS,OBESE<150CM CLAUDY-EN-Y       Family History   Problem Relation Age of Onset    Ovarian Cancer Neg Hx     Colon Cancer Neg Hx     Breast Cancer Neg Hx     Cancer Maternal Grandmother     Uterine Cancer Neg Hx     Hypertension Mother       Social History     Tobacco Use    Smoking status: Never    Smokeless tobacco: Never   Substance Use Topics    Alcohol use: No       Social History     Substance and Sexual Activity   Sexual Activity Yes    Partners: Male    Birth control/protection: None     OB History    Para Term  AB Living   3 3 2 1 0 3   SAB IAB Ectopic Molar Multiple Live Births   0 0 0 0 0 3      # Outcome Date GA Lbr Dmitriy/2nd Weight Sex Type Anes PTL Lv   3 Term 20 39w4d 10:20 / 00:43 3.525 kg (7 lb 12.3 oz) M Vag-Spont EPI N MEGAN      Name: ELAINA CURIEL      Apgar1: 9  Apgar5: 9   2 Term 17 39w1d / 00:11 3.09 kg (6 lb 13 oz) M Vag-Spont EPI N MEGAN      Name: ELAINA CURIEL      Apgar1: 8  Apgar5: 9   1  04/22/15 34w0d  2.126 kg (4 lb 11 oz) M Vag-Spont EPI

## 2025-01-08 ENCOUNTER — OFFICE VISIT (OUTPATIENT)
Dept: OBGYN CLINIC | Age: 30
End: 2025-01-08
Payer: COMMERCIAL

## 2025-01-08 VITALS — WEIGHT: 193 LBS | BODY MASS INDEX: 32.95 KG/M2 | HEIGHT: 64 IN

## 2025-01-08 DIAGNOSIS — Z30.017 ENCOUNTER FOR INITIAL PRESCRIPTION OF NEXPLANON: ICD-10-CM

## 2025-01-08 DIAGNOSIS — R35.0 URINARY FREQUENCY: ICD-10-CM

## 2025-01-08 DIAGNOSIS — N89.8 VAGINAL ODOR: Primary | ICD-10-CM

## 2025-01-08 LAB
BACTERIA, WET MOUNT, POC: NEGATIVE
BILIRUBIN, URINE, POC: NEGATIVE
BLOOD URINE, POC: NORMAL
CLUE CELLS, WET MOUNT, POC: NORMAL
GLUCOSE URINE, POC: NEGATIVE
KETONES, URINE, POC: NEGATIVE
LEUKOCYTE ESTERASE, URINE, POC: NEGATIVE
NITRITE, URINE, POC: NEGATIVE
PH, URINE, POC: 6 (ref 4.6–8)
PROTEIN,URINE, POC: NEGATIVE
RBC WET MOUNT, POC: NEGATIVE
SPECIFIC GRAVITY, URINE, POC: 1.01 (ref 1–1.03)
TRICH, WET MOUNT, POC: NORMAL
URINALYSIS CLARITY, POC: CLEAR
URINALYSIS COLOR, POC: YELLOW
UROBILINOGEN, POC: NORMAL
WBC, WET MOUNT, POC: NEGATIVE
YEAST, WET MOUNT, POC: NORMAL

## 2025-01-08 PROCEDURE — 99204 OFFICE O/P NEW MOD 45 MIN: CPT | Performed by: OBSTETRICS & GYNECOLOGY

## 2025-01-08 PROCEDURE — 99459 PELVIC EXAMINATION: CPT | Performed by: OBSTETRICS & GYNECOLOGY

## 2025-01-08 PROCEDURE — 87210 SMEAR WET MOUNT SALINE/INK: CPT | Performed by: OBSTETRICS & GYNECOLOGY

## 2025-01-08 PROCEDURE — 81003 URINALYSIS AUTO W/O SCOPE: CPT | Performed by: OBSTETRICS & GYNECOLOGY

## 2025-01-08 RX ORDER — METRONIDAZOLE 500 MG/1
TABLET ORAL
Qty: 14 TABLET | Refills: 5 | Status: SHIPPED | OUTPATIENT
Start: 2025-01-08

## 2025-01-08 NOTE — PROGRESS NOTES
HPI  Turner Curiel is a 29 y.o. female seen for urinary frequency and recurrent vaginal odor.  She wants Nexplanon    Past Medical History, Past Surgical History, Family history, Social History, and Medications were all reviewed with the patient today and updated as necessary.     Current Outpatient Medications   Medication Sig    metroNIDAZOLE (FLAGYL) 500 MG tablet BID for 7 days    tretinoin (RETIN-A) 0.025 % cream Apply 1 Application topically    medroxyPROGESTERone (DEPO-PROVERA) 150 MG/ML injection Inject 1 mL into the muscle (Patient not taking: Reported on 2025)     No current facility-administered medications for this visit.     Allergies   Allergen Reactions    Sulfamethoxazole-Trimethoprim Rash     Past Medical History:   Diagnosis Date    Abnormal Papanicolaou smear of cervix     Contact dermatitis and eczema due to cause     Gestational hypertension 2017    Last Assessment & Plan:  BP normal to mild No preeclampsia symptoms.      Gestational hypertension      Past Surgical History:   Procedure Laterality Date    GASTRIC BYPASS,OBESE<150CM CLAUDY-EN-Y      Pt states she did not have this surgery     Family History   Problem Relation Age of Onset    Hypertension Mother     Cancer Maternal Grandmother     Ovarian Cancer Neg Hx     Colon Cancer Neg Hx     Breast Cancer Neg Hx     Uterine Cancer Neg Hx       Social History     Tobacco Use    Smoking status: Never    Smokeless tobacco: Never   Substance Use Topics    Alcohol use: No       Social History     Substance and Sexual Activity   Sexual Activity Yes    Partners: Male    Birth control/protection: None     OB History    Para Term  AB Living   3 3 2 1 0 3   SAB IAB Ectopic Molar Multiple Live Births   0 0 0 0 0 3      # Outcome Date GA Lbr Dmitriy/2nd Weight Sex Type Anes PTL Lv   3 Term 20 39w4d 10:20 / 00:43 3.525 kg (7 lb 12.3 oz) M Vag-Spont EPI N MEGAN      Name: ELAINA CURIEL      Apgar1: 9  Apgar5: 9   2 Term

## 2025-02-26 ENCOUNTER — OFFICE VISIT (OUTPATIENT)
Dept: FAMILY MEDICINE CLINIC | Facility: CLINIC | Age: 30
End: 2025-02-26

## 2025-02-26 VITALS
WEIGHT: 198 LBS | HEIGHT: 64 IN | DIASTOLIC BLOOD PRESSURE: 84 MMHG | BODY MASS INDEX: 33.8 KG/M2 | TEMPERATURE: 98.4 F | RESPIRATION RATE: 18 BRPM | SYSTOLIC BLOOD PRESSURE: 124 MMHG | HEART RATE: 93 BPM | OXYGEN SATURATION: 98 %

## 2025-02-26 DIAGNOSIS — R35.0 URINARY FREQUENCY: Primary | ICD-10-CM

## 2025-02-26 LAB
BILIRUBIN, URINE, POC: NEGATIVE
BLOOD URINE, POC: ABNORMAL
GLUCOSE URINE, POC: NEGATIVE
KETONES, URINE, POC: NEGATIVE
LEUKOCYTE ESTERASE, URINE, POC: NEGATIVE
NITRITE, URINE, POC: NEGATIVE
PH, URINE, POC: 8.5 (ref 4.6–8)
PROTEIN,URINE, POC: 30
SPECIFIC GRAVITY, URINE, POC: 1.02 (ref 1–1.03)
URINALYSIS CLARITY, POC: CLEAR
URINALYSIS COLOR, POC: YELLOW
UROBILINOGEN, POC: ABNORMAL

## 2025-02-26 SDOH — ECONOMIC STABILITY: FOOD INSECURITY: WITHIN THE PAST 12 MONTHS, THE FOOD YOU BOUGHT JUST DIDN'T LAST AND YOU DIDN'T HAVE MONEY TO GET MORE.: NEVER TRUE

## 2025-02-26 SDOH — ECONOMIC STABILITY: FOOD INSECURITY: WITHIN THE PAST 12 MONTHS, YOU WORRIED THAT YOUR FOOD WOULD RUN OUT BEFORE YOU GOT MONEY TO BUY MORE.: NEVER TRUE

## 2025-02-26 ASSESSMENT — ENCOUNTER SYMPTOMS
EYES NEGATIVE: 1
GASTROINTESTINAL NEGATIVE: 1
RESPIRATORY NEGATIVE: 1
ABDOMINAL PAIN: 0

## 2025-02-26 ASSESSMENT — PATIENT HEALTH QUESTIONNAIRE - PHQ9
SUM OF ALL RESPONSES TO PHQ QUESTIONS 1-9: 0
2. FEELING DOWN, DEPRESSED OR HOPELESS: NOT AT ALL
1. LITTLE INTEREST OR PLEASURE IN DOING THINGS: NOT AT ALL
SUM OF ALL RESPONSES TO PHQ QUESTIONS 1-9: 0
SUM OF ALL RESPONSES TO PHQ9 QUESTIONS 1 & 2: 0

## 2025-02-26 NOTE — PROGRESS NOTES
Turner Curiel (:  1995) is a 29 y.o. female,Established patient, here for evaluation of the following chief complaint(s):  Urinary Frequency (Began a few days ago//denies any pain when urinating )         Assessment & Plan  Urinary frequency   She does not have a clear UTI nor any suggestion or concern for STI.  She has started drinking more water purposefully, so that may be the cause.  She does not want any further testing at this time, so I encouraged her to take cranberry tabs and yogurt with activated cultures.  See us if sx not resolving or new issues develop.             F/u prn        Subjective   Urinary frequency off and on for a couple of days.  No dysuria, no vag dc.  Has had BV in past but no dc this time.  No blood in urine and no discoloration or odor.  Good health these days.  No back pain or fevers.  No n/v.    Urinary Frequency   Associated symptoms include frequency.       Review of Systems   Constitutional: Negative.    HENT: Negative.     Eyes: Negative.    Respiratory: Negative.     Cardiovascular: Negative.    Gastrointestinal: Negative.  Negative for abdominal pain.   Genitourinary:  Positive for frequency. Negative for difficulty urinating, dysuria, vaginal discharge and vaginal pain.          Objective   Physical Exam  avss  no abd/low back or CVA tenderness.  UA neg with no markers of infection noted.               An electronic signature was used to authenticate this note.    --Andre Jason MD

## 2025-03-14 ENCOUNTER — PATIENT MESSAGE (OUTPATIENT)
Dept: OBGYN CLINIC | Age: 30
End: 2025-03-14

## 2025-03-14 DIAGNOSIS — N89.8 VAGINAL ODOR: Primary | ICD-10-CM

## 2025-03-17 RX ORDER — METRONIDAZOLE 500 MG/1
500 TABLET ORAL 2 TIMES DAILY
Qty: 14 TABLET | Refills: 4 | Status: SHIPPED | OUTPATIENT
Start: 2025-03-17

## 2025-03-24 ENCOUNTER — OFFICE VISIT (OUTPATIENT)
Dept: FAMILY MEDICINE CLINIC | Facility: CLINIC | Age: 30
End: 2025-03-24
Payer: COMMERCIAL

## 2025-03-24 VITALS
RESPIRATION RATE: 18 BRPM | WEIGHT: 197 LBS | DIASTOLIC BLOOD PRESSURE: 78 MMHG | HEIGHT: 64 IN | HEART RATE: 87 BPM | SYSTOLIC BLOOD PRESSURE: 122 MMHG | TEMPERATURE: 97 F | OXYGEN SATURATION: 97 % | BODY MASS INDEX: 33.63 KG/M2

## 2025-03-24 DIAGNOSIS — R35.0 URINARY FREQUENCY: ICD-10-CM

## 2025-03-24 DIAGNOSIS — R30.1 PAINFUL BLADDER SPASM: Primary | ICD-10-CM

## 2025-03-24 DIAGNOSIS — M54.50 CHRONIC LEFT-SIDED LOW BACK PAIN WITHOUT SCIATICA: ICD-10-CM

## 2025-03-24 DIAGNOSIS — G89.29 CHRONIC LEFT-SIDED LOW BACK PAIN WITHOUT SCIATICA: ICD-10-CM

## 2025-03-24 PROCEDURE — 99214 OFFICE O/P EST MOD 30 MIN: CPT | Performed by: NURSE PRACTITIONER

## 2025-03-24 PROCEDURE — 81000 URINALYSIS NONAUTO W/SCOPE: CPT | Performed by: NURSE PRACTITIONER

## 2025-03-24 RX ORDER — CEPHALEXIN 500 MG/1
500 CAPSULE ORAL 2 TIMES DAILY
Qty: 10 CAPSULE | Refills: 0 | Status: SHIPPED | OUTPATIENT
Start: 2025-03-24

## 2025-03-24 SDOH — ECONOMIC STABILITY: FOOD INSECURITY: WITHIN THE PAST 12 MONTHS, YOU WORRIED THAT YOUR FOOD WOULD RUN OUT BEFORE YOU GOT MONEY TO BUY MORE.: NEVER TRUE

## 2025-03-24 SDOH — ECONOMIC STABILITY: FOOD INSECURITY: WITHIN THE PAST 12 MONTHS, THE FOOD YOU BOUGHT JUST DIDN'T LAST AND YOU DIDN'T HAVE MONEY TO GET MORE.: NEVER TRUE

## 2025-03-24 ASSESSMENT — ENCOUNTER SYMPTOMS
ABDOMINAL PAIN: 0
SHORTNESS OF BREATH: 0
BACK PAIN: 1
CONSTIPATION: 0
EYE DISCHARGE: 0
CHEST TIGHTNESS: 0
COUGH: 0
WHEEZING: 0
RHINORRHEA: 0
EYE PAIN: 0
DIARRHEA: 0
BLOOD IN STOOL: 0
VOMITING: 0

## 2025-03-24 ASSESSMENT — PATIENT HEALTH QUESTIONNAIRE - PHQ9
2. FEELING DOWN, DEPRESSED OR HOPELESS: NOT AT ALL
SUM OF ALL RESPONSES TO PHQ QUESTIONS 1-9: 0
1. LITTLE INTEREST OR PLEASURE IN DOING THINGS: NOT AT ALL
SUM OF ALL RESPONSES TO PHQ QUESTIONS 1-9: 0

## 2025-03-24 NOTE — PROGRESS NOTES
and atraumatic.      Right Ear: Tympanic membrane, ear canal and external ear normal.      Left Ear: Tympanic membrane, ear canal and external ear normal.      Nose: Nose normal.      Mouth/Throat:      Mouth: Mucous membranes are moist.   Eyes:      Extraocular Movements: Extraocular movements intact.      Conjunctiva/sclera: Conjunctivae normal.      Pupils: Pupils are equal, round, and reactive to light.   Cardiovascular:      Rate and Rhythm: Normal rate and regular rhythm.      Pulses: Normal pulses.      Heart sounds: Normal heart sounds.   Pulmonary:      Effort: Pulmonary effort is normal.      Breath sounds: Normal breath sounds.   Abdominal:      General: Bowel sounds are normal.      Tenderness: There is no right CVA tenderness or left CVA tenderness.   Musculoskeletal:         General: Normal range of motion.      Cervical back: Normal range of motion and neck supple.      Lumbar back: Spasms and tenderness present. No swelling. Normal range of motion. Negative right straight leg raise test and negative left straight leg raise test.      Right lower leg: No edema.      Left lower leg: No edema.      Comments: Tenderness left flank area above left hip.  No hip tenderness.     Skin:     General: Skin is warm and dry.   Neurological:      General: No focal deficit present.      Mental Status: She is alert and oriented to person, place, and time.   Psychiatric:         Mood and Affect: Mood normal.         Behavior: Behavior normal.         Thought Content: Thought content normal.         Judgment: Judgment normal.        PHQ-9 Total Score: 0 (3/24/2025  3:53 PM)    Body mass index is 33.81 kg/m².          An electronic signature was used to authenticate this note.    --Abimbola Domínguez, SOHAIL - CNP

## 2025-03-25 LAB
BACTERIA URINE, POC: ABNORMAL
BILIRUBIN, URINE, POC: NEGATIVE
BLOOD URINE, POC: ABNORMAL
CASTS URINE, POC: 0
EPI CELLS URINE, POC: ABNORMAL
GLUCOSE URINE, POC: NEGATIVE
KETONES, URINE, POC: NEGATIVE
LEUKOCYTE ESTERASE, URINE, POC: NEGATIVE
NITRITE, URINE, POC: NEGATIVE
PH, URINE, POC: 5.5 (ref 4.6–8)
PROTEIN,URINE, POC: NEGATIVE
RBC, URINE, POC: ABNORMAL
SPECIFIC GRAVITY, URINE, POC: 1.03 (ref 1–1.03)
TRICHOMONAS URINE, POC: NEGATIVE
URINALYSIS CLARITY, POC: CLEAR
URINALYSIS COLOR, POC: ABNORMAL
UROBILINOGEN, POC: NORMAL MG/DL
WBC, URINE, POC: ABNORMAL
YEAST, URINE, POC: NEGATIVE

## 2025-03-25 ASSESSMENT — ENCOUNTER SYMPTOMS
BOWEL INCONTINENCE: 0
NAUSEA: 0

## 2025-03-27 ENCOUNTER — RESULTS FOLLOW-UP (OUTPATIENT)
Dept: FAMILY MEDICINE CLINIC | Facility: CLINIC | Age: 30
End: 2025-03-27

## 2025-03-27 DIAGNOSIS — B95.1 GROUP B STREPTOCOCCAL UTI: Primary | ICD-10-CM

## 2025-03-27 DIAGNOSIS — N39.0 GROUP B STREPTOCOCCAL UTI: Primary | ICD-10-CM

## 2025-03-27 LAB
BACTERIA SPEC CULT: ABNORMAL
BACTERIA SPEC CULT: ABNORMAL
SERVICE CMNT-IMP: ABNORMAL

## 2025-05-05 ENCOUNTER — OFFICE VISIT (OUTPATIENT)
Dept: FAMILY MEDICINE CLINIC | Facility: CLINIC | Age: 30
End: 2025-05-05
Payer: COMMERCIAL

## 2025-05-05 VITALS
WEIGHT: 194 LBS | TEMPERATURE: 97.6 F | DIASTOLIC BLOOD PRESSURE: 90 MMHG | HEART RATE: 82 BPM | SYSTOLIC BLOOD PRESSURE: 118 MMHG | HEIGHT: 64 IN | BODY MASS INDEX: 33.12 KG/M2 | OXYGEN SATURATION: 99 % | RESPIRATION RATE: 18 BRPM

## 2025-05-05 DIAGNOSIS — L02.821 BOIL OF HEAD OR SCALP: Primary | ICD-10-CM

## 2025-05-05 PROCEDURE — 99213 OFFICE O/P EST LOW 20 MIN: CPT | Performed by: NURSE PRACTITIONER

## 2025-05-05 RX ORDER — CEPHALEXIN 500 MG/1
500 CAPSULE ORAL 2 TIMES DAILY
Qty: 20 CAPSULE | Refills: 0 | Status: SHIPPED | OUTPATIENT
Start: 2025-05-05 | End: 2025-05-15

## 2025-05-05 SDOH — ECONOMIC STABILITY: FOOD INSECURITY: WITHIN THE PAST 12 MONTHS, YOU WORRIED THAT YOUR FOOD WOULD RUN OUT BEFORE YOU GOT MONEY TO BUY MORE.: NEVER TRUE

## 2025-05-05 SDOH — ECONOMIC STABILITY: FOOD INSECURITY: WITHIN THE PAST 12 MONTHS, THE FOOD YOU BOUGHT JUST DIDN'T LAST AND YOU DIDN'T HAVE MONEY TO GET MORE.: NEVER TRUE

## 2025-05-05 ASSESSMENT — PATIENT HEALTH QUESTIONNAIRE - PHQ9
SUM OF ALL RESPONSES TO PHQ QUESTIONS 1-9: 0
2. FEELING DOWN, DEPRESSED OR HOPELESS: NOT AT ALL
SUM OF ALL RESPONSES TO PHQ QUESTIONS 1-9: 0
SUM OF ALL RESPONSES TO PHQ QUESTIONS 1-9: 0
1. LITTLE INTEREST OR PLEASURE IN DOING THINGS: NOT AT ALL
SUM OF ALL RESPONSES TO PHQ QUESTIONS 1-9: 0

## 2025-05-05 ASSESSMENT — ENCOUNTER SYMPTOMS
COLOR CHANGE: 1
GASTROINTESTINAL NEGATIVE: 1
RESPIRATORY NEGATIVE: 1

## 2025-05-05 NOTE — PROGRESS NOTES
Turner Curiel (:  1995) is a 29 y.o. female,Established patient, here for evaluation of the following chief complaint(s):  Other (Know in the center of head, been longer than a week. )         Assessment & Plan  Boil of head or scalp   New, not at goal (unstable),  start antibiotics, warm compress. See dermatology if area not completely headed. Let us know about any fever, nausea, vomiting. ER for any neurological symptoms.     Orders:    [facility]    cephALEXin (KEFLEX) 500 MG capsule; Take 1 capsule by mouth 2 times daily for 10 days             Subjective   HPI  Here for a knot in the center of her head- unsure if she has infection. On left side of her head. It is painful. It is getting larger. First noticed a week ago. Had benjamin in her hair- when she took them out realized it wast there.   Review of Systems   Constitutional:  Negative for fatigue and fever.   HENT: Negative.     Respiratory: Negative.     Cardiovascular: Negative.    Gastrointestinal: Negative.    Musculoskeletal: Negative.    Skin:  Positive for color change.   Neurological:  Negative for dizziness, tremors, seizures, syncope, facial asymmetry, speech difficulty, weakness, light-headedness, numbness and headaches.   Psychiatric/Behavioral: Negative.            Objective   Physical Exam  Constitutional:       General: She is not in acute distress.  HENT:      Head: Normocephalic.      Mouth/Throat:      Mouth: Mucous membranes are moist.      Pharynx: Oropharynx is clear. No oropharyngeal exudate.   Eyes:      Conjunctiva/sclera: Conjunctivae normal.      Pupils: Pupils are equal, round, and reactive to light.   Cardiovascular:      Rate and Rhythm: Normal rate and regular rhythm.      Pulses: Normal pulses.      Heart sounds: Normal heart sounds.   Pulmonary:      Effort: Pulmonary effort is normal.      Breath sounds: Normal breath sounds.   Musculoskeletal:      Cervical back: Normal range of motion.

## 2025-05-20 ENCOUNTER — OFFICE VISIT (OUTPATIENT)
Dept: UROLOGY | Age: 30
End: 2025-05-20
Payer: COMMERCIAL

## 2025-05-20 DIAGNOSIS — N39.0 URINARY TRACT INFECTION WITHOUT HEMATURIA, SITE UNSPECIFIED: ICD-10-CM

## 2025-05-20 DIAGNOSIS — N39.0 URINARY TRACT INFECTION WITHOUT HEMATURIA, SITE UNSPECIFIED: Primary | ICD-10-CM

## 2025-05-20 LAB
BILIRUBIN, URINE, POC: NEGATIVE
BLOOD URINE, POC: NORMAL
GLUCOSE URINE, POC: NEGATIVE MG/DL
KETONES, URINE, POC: NEGATIVE MG/DL
LEUKOCYTE ESTERASE, URINE, POC: NEGATIVE
NITRITE, URINE, POC: NEGATIVE
PH, URINE, POC: 7 (ref 4.6–8)
PROTEIN,URINE, POC: NEGATIVE MG/DL
SPECIFIC GRAVITY, URINE, POC: 1.02 (ref 1–1.03)
URINALYSIS CLARITY, POC: NORMAL
URINALYSIS COLOR, POC: NORMAL
UROBILINOGEN, POC: NORMAL MG/DL

## 2025-05-20 PROCEDURE — 99214 OFFICE O/P EST MOD 30 MIN: CPT | Performed by: NURSE PRACTITIONER

## 2025-05-20 PROCEDURE — 81003 URINALYSIS AUTO W/O SCOPE: CPT | Performed by: NURSE PRACTITIONER

## 2025-05-20 RX ORDER — MIRABEGRON 50 MG/1
50 TABLET, FILM COATED, EXTENDED RELEASE ORAL DAILY
Qty: 90 TABLET | Refills: 3 | Status: SHIPPED | OUTPATIENT
Start: 2025-05-20

## 2025-05-20 ASSESSMENT — ENCOUNTER SYMPTOMS
BACK PAIN: 0
NAUSEA: 0

## 2025-05-20 NOTE — PROGRESS NOTES
Last Year: No     Family History   Problem Relation Age of Onset    Hypertension Mother     Cancer Maternal Grandmother     Ovarian Cancer Neg Hx     Colon Cancer Neg Hx     Breast Cancer Neg Hx     Uterine Cancer Neg Hx        Review of Systems  Constitutional:   Negative for fever.  GI:  Negative for nausea.  Musculoskeletal:  Negative for back pain.      Urinalysis  UA - Dipstick  Results for orders placed or performed in visit on 05/20/25   AMB POC URINALYSIS DIP STICK AUTO W/O MICRO    Collection Time: 05/20/25 10:47 AM   Result Value Ref Range    Color (UA POC)      Clarity (UA POC)      Glucose, Urine, POC Negative Negative mg/dL    Bilirubin, Urine, POC Negative Negative    KETONES, Urine, POC Negative Negative mg/dL    Specific Gravity, Urine, POC 1.020 1.001 - 1.035    Blood (UA POC) Trace-intact     pH, Urine, POC 7.0 4.6 - 8.0    Protein, Urine, POC Negative Negative mg/dL    Urobilinogen, POC 0.2 mg/dL <1.1 mg/dL    Nitrite, Urine, POC Negative Negative    Leukocyte Esterase, Urine, POC Negative Negative   Results for orders placed or performed in visit on 02/26/25   AMB POC URINALYSIS DIP STICK AUTO W/O MICRO    Collection Time: 02/26/25 10:35 AM   Result Value Ref Range    Color, Urine, POC Yellow     Clarity, Urine, POC Clear     Glucose, Urine, POC Negative     Bilirubin, Urine, POC Negative     Ketones, Urine, POC Negative     Specific Gravity, Urine, POC 1.020 1.001 - 1.035    Blood, Urine, POC Trace-intact Negative    pH, Urine, POC 8.5 (A) 4.6 - 8.0    Protein, Urine, POC 30     Urobilinogen, POC 0.2 mg/dL     Nitrite, Urine, POC Negative     Leukocyte Esterase, Urine, POC Negative      PHYSICAL EXAM    General appearance - well appearing and in no distress  Mental status - alert, oriented to person, place, and time  Chest - Easy work of breathing.  Neck - supple, no significant adenopathy   Skin - normal coloration and turgor, no rashes      Assessment and Plan    ICD-10-CM    1. Urinary tract

## 2025-05-22 LAB
BACTERIA SPEC CULT: NORMAL
SERVICE CMNT-IMP: NORMAL

## 2025-05-27 ENCOUNTER — RESULTS FOLLOW-UP (OUTPATIENT)
Dept: UROLOGY | Age: 30
End: 2025-05-27

## 2025-06-18 ENCOUNTER — OFFICE VISIT (OUTPATIENT)
Dept: FAMILY MEDICINE CLINIC | Facility: CLINIC | Age: 30
End: 2025-06-18
Payer: COMMERCIAL

## 2025-06-18 VITALS
OXYGEN SATURATION: 99 % | DIASTOLIC BLOOD PRESSURE: 88 MMHG | RESPIRATION RATE: 18 BRPM | HEIGHT: 64 IN | SYSTOLIC BLOOD PRESSURE: 118 MMHG | TEMPERATURE: 97.8 F | WEIGHT: 198 LBS | HEART RATE: 83 BPM | BODY MASS INDEX: 33.8 KG/M2

## 2025-06-18 DIAGNOSIS — R35.0 URINARY FREQUENCY: Primary | ICD-10-CM

## 2025-06-18 LAB
BILIRUBIN, URINE, POC: NEGATIVE
BLOOD URINE, POC: NORMAL
GLUCOSE URINE, POC: NEGATIVE
KETONES, URINE, POC: NEGATIVE
LEUKOCYTE ESTERASE, URINE, POC: NEGATIVE
NITRITE, URINE, POC: NEGATIVE
PH, URINE, POC: 6 (ref 4.6–8)
PROTEIN,URINE, POC: NEGATIVE
SPECIFIC GRAVITY, URINE, POC: 1.01 (ref 1–1.03)
URINALYSIS CLARITY, POC: NORMAL
URINALYSIS COLOR, POC: YELLOW
UROBILINOGEN, POC: NORMAL

## 2025-06-18 PROCEDURE — 99214 OFFICE O/P EST MOD 30 MIN: CPT | Performed by: FAMILY MEDICINE

## 2025-06-18 PROCEDURE — 81003 URINALYSIS AUTO W/O SCOPE: CPT | Performed by: FAMILY MEDICINE

## 2025-06-18 RX ORDER — NITROFURANTOIN 25; 75 MG/1; MG/1
CAPSULE ORAL
Qty: 14 CAPSULE | Refills: 0 | Status: SHIPPED | OUTPATIENT
Start: 2025-06-18

## 2025-06-18 RX ORDER — METRONIDAZOLE TOPICAL 7.5 MG/G
GEL TOPICAL
Qty: 45 G | Refills: 0 | Status: SHIPPED | OUTPATIENT
Start: 2025-06-18

## 2025-06-18 RX ORDER — CEPHALEXIN 500 MG/1
500 CAPSULE ORAL 4 TIMES DAILY
Qty: 28 CAPSULE | Refills: 0 | Status: SHIPPED | OUTPATIENT
Start: 2025-06-18 | End: 2025-06-18

## 2025-06-18 NOTE — PATIENT INSTRUCTIONS
Please drink a lot of extra water to flush out your bladder and use cranberry juice or tablets to help clear up the infection.  Probiotics like activated yogurt also help.  We will call you with results once they become available.  If not improving or symptoms after a week or so please return to see us.  Best wishes, Dr. Jason

## 2025-06-18 NOTE — ASSESSMENT & PLAN NOTE
The uA could be a false neg so sending abx.  Also will give metrogel given history of BV.  Sending urine testing for STI.    Orders:    Chlamydia, Gonorrhea, Trichomoniasis; Future    Genital Mycoplasmas FAREED, Urine; Future

## 2025-06-18 NOTE — PROGRESS NOTES
Turner Curiel (:  1995) is a 29 y.o. female,Established patient, here for evaluation of the following chief complaint(s):  Frequent/Recurrent UTI (Has recurring bacterial vaginal infections )         Assessment & Plan  Urinary frequency   The uA could be a false neg so sending abx.  Also will give metrogel given history of BV.  Sending urine testing for STI.    Orders:    Chlamydia, Gonorrhea, Trichomoniasis; Future    Genital Mycoplasmas FAREED, Urine; Future      Return if symptoms worsen or fail to improve.       Subjective   Urinary freq and odor.  The freq is chronic and has seen uro.  Given mybetriq without improvmemt.  Odor is new for the last week or so.  This seems different and smells like onion not fishy smell as she has had with BV.  No vag dc and no sex sx.  No reported STI.     Frequent/Recurrent UTI        Review of Systems  no other acute illness       Objective   Physical Exam  UA shows no real markers for UTI.  Order placed for STI eval             An electronic signature was used to authenticate this note.    --Andre Jason MD

## 2025-06-19 DIAGNOSIS — R35.0 URINARY FREQUENCY: ICD-10-CM

## 2025-06-22 LAB
C TRACH RRNA SPEC QL NAA+PROBE: NEGATIVE
N GONORRHOEA RRNA SPEC QL NAA+PROBE: NEGATIVE
SPECIMEN SOURCE: NORMAL
T VAGINALIS RRNA SPEC QL NAA+PROBE: NEGATIVE

## 2025-06-23 LAB
M GENITALIUM DNA SPEC QL NAA+PROBE: NEGATIVE
M HOMINIS DNA SPEC QL NAA+PROBE: NEGATIVE
UREAPLASMA DNA SPEC QL NAA+PROBE: NEGATIVE

## 2025-06-24 ENCOUNTER — RESULTS FOLLOW-UP (OUTPATIENT)
Dept: FAMILY MEDICINE CLINIC | Facility: CLINIC | Age: 30
End: 2025-06-24

## 2025-06-30 NOTE — PROGRESS NOTES
([id number])  3. Urinary frequency  -     Culture, Urine     Her urine was clear today.  Will culture.  Advised she needs to quit drinking the sodas which can be an irritant to the bladder.  Will send Sangita Chicas message to see if she thinks she may need cystoscopy - IC?  In talking  with her further about the odor she says the only place she notices the odor is in the crease of her legs.  It is more pronounced at work when she sweats a lot.  She has taken numerous antibiotics for BV in the past.  Advised I do not think she has BV.  Advised loose clothing instead of the leggings.  She may want to use powder to keep the crease of the legs from sweating.  Advised she can try Marya        Time:  I spent  30 minutes in preparing to see patient (including chart review and preparation), obtaining and/or reviewing additional medical history, performing a physical exam and evaluation, documenting clinical information in the electronic health record, independently interpreting results, communicating results to patient, family or caregiver, and/or coordinating care.          No follow-ups on file.       Chaperone utilized during exam    IVAN WALDEN MD

## 2025-07-01 ENCOUNTER — OFFICE VISIT (OUTPATIENT)
Dept: OBGYN CLINIC | Age: 30
End: 2025-07-01
Payer: COMMERCIAL

## 2025-07-01 VITALS
BODY MASS INDEX: 34.31 KG/M2 | SYSTOLIC BLOOD PRESSURE: 122 MMHG | HEIGHT: 64 IN | DIASTOLIC BLOOD PRESSURE: 82 MMHG | WEIGHT: 201 LBS

## 2025-07-01 DIAGNOSIS — N89.8 VAGINAL ODOR: ICD-10-CM

## 2025-07-01 DIAGNOSIS — R35.0 URINARY FREQUENCY: ICD-10-CM

## 2025-07-01 DIAGNOSIS — R30.0 DYSURIA: Primary | ICD-10-CM

## 2025-07-01 LAB
BILIRUBIN, URINE, POC: NEGATIVE
BLOOD URINE, POC: NORMAL
GLUCOSE URINE, POC: NEGATIVE
KETONES, URINE, POC: NEGATIVE
LEUKOCYTE ESTERASE, URINE, POC: NEGATIVE
NITRITE, URINE, POC: NEGATIVE
PH, URINE, POC: 7 (ref 4.6–8)
PROTEIN,URINE, POC: NEGATIVE
SPECIFIC GRAVITY, URINE, POC: 1.03 (ref 1–1.03)
URINALYSIS CLARITY, POC: CLEAR
URINALYSIS COLOR, POC: NORMAL
UROBILINOGEN, POC: NORMAL
WET PREP (POC): NEGATIVE

## 2025-07-01 PROCEDURE — 87210 SMEAR WET MOUNT SALINE/INK: CPT | Performed by: OBSTETRICS & GYNECOLOGY

## 2025-07-01 PROCEDURE — 81003 URINALYSIS AUTO W/O SCOPE: CPT | Performed by: OBSTETRICS & GYNECOLOGY

## 2025-07-01 PROCEDURE — 99459 PELVIC EXAMINATION: CPT | Performed by: OBSTETRICS & GYNECOLOGY

## 2025-07-01 PROCEDURE — 99214 OFFICE O/P EST MOD 30 MIN: CPT | Performed by: OBSTETRICS & GYNECOLOGY

## 2025-07-01 RX ORDER — MEDROXYPROGESTERONE ACETATE 150 MG/ML
150 INJECTION, SUSPENSION INTRAMUSCULAR
COMMUNITY

## 2025-07-03 LAB
BACTERIA SPEC CULT: ABNORMAL
BACTERIA SPEC CULT: ABNORMAL
SERVICE CMNT-IMP: ABNORMAL

## 2025-07-07 ENCOUNTER — RESULTS FOLLOW-UP (OUTPATIENT)
Dept: OBGYN CLINIC | Age: 30
End: 2025-07-07

## 2025-08-05 ENCOUNTER — OFFICE VISIT (OUTPATIENT)
Dept: UROLOGY | Age: 30
End: 2025-08-05
Payer: COMMERCIAL

## 2025-08-05 DIAGNOSIS — N39.0 URINARY TRACT INFECTION WITHOUT HEMATURIA, SITE UNSPECIFIED: Primary | ICD-10-CM

## 2025-08-05 LAB
BILIRUBIN, URINE, POC: NEGATIVE
BLOOD URINE, POC: NORMAL
GLUCOSE URINE, POC: NEGATIVE MG/DL
KETONES, URINE, POC: NEGATIVE MG/DL
LEUKOCYTE ESTERASE, URINE, POC: NORMAL
NITRITE, URINE, POC: NEGATIVE
PH, URINE, POC: 6.5 (ref 4.6–8)
PROTEIN,URINE, POC: 30 MG/DL
SPECIFIC GRAVITY, URINE, POC: 1.02 (ref 1–1.03)
URINALYSIS CLARITY, POC: NORMAL
URINALYSIS COLOR, POC: NORMAL
UROBILINOGEN, POC: NORMAL MG/DL

## 2025-08-05 PROCEDURE — 81003 URINALYSIS AUTO W/O SCOPE: CPT | Performed by: NURSE PRACTITIONER

## 2025-08-05 PROCEDURE — 99214 OFFICE O/P EST MOD 30 MIN: CPT | Performed by: NURSE PRACTITIONER

## 2025-08-05 RX ORDER — SOLIFENACIN SUCCINATE 5 MG/1
10 TABLET, FILM COATED ORAL DAILY
Qty: 90 TABLET | Refills: 3 | Status: SHIPPED | OUTPATIENT
Start: 2025-08-05

## 2025-08-05 ASSESSMENT — ENCOUNTER SYMPTOMS: NAUSEA: 0

## 2025-09-02 ENCOUNTER — OFFICE VISIT (OUTPATIENT)
Dept: FAMILY MEDICINE CLINIC | Facility: CLINIC | Age: 30
End: 2025-09-02
Payer: COMMERCIAL

## 2025-09-02 VITALS
BODY MASS INDEX: 33.97 KG/M2 | TEMPERATURE: 97.4 F | DIASTOLIC BLOOD PRESSURE: 82 MMHG | SYSTOLIC BLOOD PRESSURE: 120 MMHG | OXYGEN SATURATION: 98 % | HEART RATE: 81 BPM | WEIGHT: 199 LBS | HEIGHT: 64 IN | RESPIRATION RATE: 18 BRPM

## 2025-09-02 DIAGNOSIS — R30.0 BURNING WITH URINATION: Primary | ICD-10-CM

## 2025-09-02 DIAGNOSIS — N76.0 ACUTE VAGINITIS: ICD-10-CM

## 2025-09-02 LAB
BILIRUBIN, URINE, POC: NEGATIVE
BLOOD URINE, POC: ABNORMAL
GLUCOSE URINE, POC: NEGATIVE MG/DL
KETONES, URINE, POC: NEGATIVE MG/DL
LEUKOCYTE ESTERASE, URINE, POC: NEGATIVE
NITRITE, URINE, POC: NEGATIVE
PH, URINE, POC: 6.5 (ref 4.6–8)
PROTEIN,URINE, POC: NEGATIVE MG/DL
SPECIFIC GRAVITY, URINE, POC: 1.03 (ref 1–1.03)
URINALYSIS CLARITY, POC: CLEAR
URINALYSIS COLOR, POC: YELLOW
UROBILINOGEN, POC: ABNORMAL MG/DL

## 2025-09-02 PROCEDURE — 99213 OFFICE O/P EST LOW 20 MIN: CPT | Performed by: NURSE PRACTITIONER

## 2025-09-02 PROCEDURE — 81003 URINALYSIS AUTO W/O SCOPE: CPT | Performed by: NURSE PRACTITIONER

## 2025-09-02 RX ORDER — FLUCONAZOLE 150 MG/1
TABLET ORAL
Qty: 2 TABLET | Refills: 0 | Status: SHIPPED | OUTPATIENT
Start: 2025-09-02

## 2025-09-02 SDOH — ECONOMIC STABILITY: FOOD INSECURITY: WITHIN THE PAST 12 MONTHS, THE FOOD YOU BOUGHT JUST DIDN'T LAST AND YOU DIDN'T HAVE MONEY TO GET MORE.: NEVER TRUE

## 2025-09-02 SDOH — ECONOMIC STABILITY: FOOD INSECURITY: WITHIN THE PAST 12 MONTHS, YOU WORRIED THAT YOUR FOOD WOULD RUN OUT BEFORE YOU GOT MONEY TO BUY MORE.: NEVER TRUE

## 2025-09-02 ASSESSMENT — PATIENT HEALTH QUESTIONNAIRE - PHQ9
1. LITTLE INTEREST OR PLEASURE IN DOING THINGS: NOT AT ALL
SUM OF ALL RESPONSES TO PHQ QUESTIONS 1-9: 0
2. FEELING DOWN, DEPRESSED OR HOPELESS: NOT AT ALL
SUM OF ALL RESPONSES TO PHQ QUESTIONS 1-9: 0

## 2025-09-02 ASSESSMENT — ENCOUNTER SYMPTOMS
DIARRHEA: 0
BACK PAIN: 0
ABDOMINAL PAIN: 1
VOMITING: 0
RESPIRATORY NEGATIVE: 1
NAUSEA: 0
CONSTIPATION: 0